# Patient Record
Sex: FEMALE | Race: BLACK OR AFRICAN AMERICAN | URBAN - METROPOLITAN AREA
[De-identification: names, ages, dates, MRNs, and addresses within clinical notes are randomized per-mention and may not be internally consistent; named-entity substitution may affect disease eponyms.]

---

## 2018-08-28 ENCOUNTER — TELEPHONE (OUTPATIENT)
Dept: VASCULAR SURGERY | Facility: CLINIC | Age: 58
End: 2018-08-28

## 2018-08-28 DIAGNOSIS — R60.0 EDEMA OF LOWER EXTREMITY: Primary | ICD-10-CM

## 2018-08-28 NOTE — TELEPHONE ENCOUNTER
I called with the aid of a [facility]  and spoke with patient daughter.  I wasn't able to speak with Manasa per daughter who answered all my questions.  Manasa has had RLE swelling from her whole leg hip to toe for the last few years.  Pt has not had vein surgeries, no trauma to leg or pelvis, no DVT.  Pt has tried compression stockings and a machine that squeezed her legs and that didn't help per daughter.  No slow healing wounds.  Pt has pain on and off with her legs.  Pt scheduled for RLE venous comp study prior to seeing Dr. Alfaro.  All questions answered.  LAYNE Castelan, RN, BSN  Interventional Radiology Care Coordinator   Phone:  [phone]

## 2018-08-30 ENCOUNTER — RADIANT APPOINTMENT (OUTPATIENT)
Dept: ULTRASOUND IMAGING | Facility: CLINIC | Age: 58
End: 2018-08-30
Attending: RADIOLOGY
Payer: COMMERCIAL

## 2018-08-30 ENCOUNTER — OFFICE VISIT (OUTPATIENT)
Dept: VASCULAR SURGERY | Facility: CLINIC | Age: 58
End: 2018-08-30
Payer: COMMERCIAL

## 2018-08-30 VITALS — SYSTOLIC BLOOD PRESSURE: 196 MMHG | HEART RATE: 53 BPM | DIASTOLIC BLOOD PRESSURE: 83 MMHG

## 2018-08-30 DIAGNOSIS — R60.0 LEG EDEMA, RIGHT: ICD-10-CM

## 2018-08-30 DIAGNOSIS — R60.0 LEG EDEMA, RIGHT: Primary | ICD-10-CM

## 2018-08-30 DIAGNOSIS — R60.0 EDEMA OF LOWER EXTREMITY: ICD-10-CM

## 2018-08-30 LAB
BASOPHILS # BLD AUTO: 0 10E9/L (ref 0–0.2)
BASOPHILS NFR BLD AUTO: 0.6 %
DIFFERENTIAL METHOD BLD: ABNORMAL
EOSINOPHIL # BLD AUTO: 0.1 10E9/L (ref 0–0.7)
EOSINOPHIL NFR BLD AUTO: 2.5 %
ERYTHROCYTE [DISTWIDTH] IN BLOOD BY AUTOMATED COUNT: 13.8 % (ref 10–15)
HCT VFR BLD AUTO: 42 % (ref 35–47)
HGB BLD-MCNC: 13.1 G/DL (ref 11.7–15.7)
IMM GRANULOCYTES # BLD: 0 10E9/L (ref 0–0.4)
IMM GRANULOCYTES NFR BLD: 0 %
LYMPHOCYTES # BLD AUTO: 1.7 10E9/L (ref 0.8–5.3)
LYMPHOCYTES NFR BLD AUTO: 52.8 %
MCH RBC QN AUTO: 29.8 PG (ref 26.5–33)
MCHC RBC AUTO-ENTMCNC: 31.2 G/DL (ref 31.5–36.5)
MCV RBC AUTO: 96 FL (ref 78–100)
MONOCYTES # BLD AUTO: 0.3 10E9/L (ref 0–1.3)
MONOCYTES NFR BLD AUTO: 7.8 %
NEUTROPHILS # BLD AUTO: 1.2 10E9/L (ref 1.6–8.3)
NEUTROPHILS NFR BLD AUTO: 36.3 %
NRBC # BLD AUTO: 0 10*3/UL
NRBC BLD AUTO-RTO: 0 /100
PLATELET # BLD AUTO: 215 10E9/L (ref 150–450)
RBC # BLD AUTO: 4.39 10E12/L (ref 3.8–5.2)
RETICS # AUTO: 73.3 10E9/L (ref 25–95)
RETICS/RBC NFR AUTO: 1.7 % (ref 0.5–2)
WBC # BLD AUTO: 3.2 10E9/L (ref 4–11)

## 2018-08-30 RX ORDER — TIMOLOL MALEATE 2.5 MG/ML
1 SOLUTION/ DROPS OPHTHALMIC 2 TIMES DAILY
COMMUNITY
End: 2020-02-04

## 2018-08-30 RX ORDER — BRIMONIDINE TARTRATE 2 MG/ML
1 SOLUTION/ DROPS OPHTHALMIC 3 TIMES DAILY
COMMUNITY
End: 2020-02-04

## 2018-08-30 ASSESSMENT — PAIN SCALES - GENERAL: PAINLEVEL: EXTREME PAIN (8)

## 2018-08-30 NOTE — PROGRESS NOTES
INTERVENTIONAL RADIOLOGY CONSULTATION    Name: Manasa Rosales  Age: 58 year old   Referring Physician: Dr. Colmenares   REASON FOR REFERRAL: Right lower extremity edema.    HPI: Manasa Rosales is a 58-year-old female referred to our clinic for right lower extremity edema.  She has progressive right lower extremity edema for the last 2-1/2 years.  Before then, she states her leg was normal.  She also complains of burning sensation and pain in the back of her leg.  She tried pneumatic compression pumps which did not help. [place] came [place] [facility] in [place].  Her leg swelling started when she was in [facility] approximately 1 year prior to her move to the .      She has a history of snake bite when she was young.  She was hospitalized a couple months ago with clinical suspicion of cellulitis.     Venous competency ultrasound performed today did not demonstrate  venous incompetency.    PAST MEDICAL HISTORY:   History reviewed. No pertinent past medical history.    PAST SURGICAL HISTORY:   History reviewed. No pertinent surgical history.    FAMILY HISTORY:   History reviewed. No pertinent family history.    SOCIAL HISTORY:   Social History   Substance Use Topics     Smoking status: Never Smoker     Smokeless tobacco: Never Used     Alcohol use Not on file       PROBLEM LIST:   There are no active problems to display for this patient.      MEDICATIONS:   Prescription Medications as of 8/30/2018             brimonidine (ALPHAGAN) 0.2 % ophthalmic solution Place 1 drop into both eyes 3 times daily    timolol (TIMOPTIC) 0.25 % ophthalmic solution 1 drop 2 times daily          ALLERGIES:   Review of patient's allergies indicates no known allergies.    ROS:  As stated in the HPI.    Physical Examination:   VITALS:   /83 (BP Location: Right leg, Patient Position: Chair, Cuff Size: Adult Regular)  Pulse 53  Constitutional: healthy, alert and no distress  Extremities: Diffusely swollen right lower extremity. No bulging  varicosities.  No wounds.      DP  PT    Left  2/2  Dopplerable   Right  Dopplerable -       Labs:    BMP RESULTS:  No results found for: NA, POTASSIUM, CHLORIDE, CO2, ANIONGAP, GLC, BUN, CR, GFRESTIMATED, GFRESTBLACK, IRINEO     CBC RESULTS:  Lab Results   Component Value Date    WBC 3.2 (L) 08/30/2018    RBC 4.39 08/30/2018    HGB 13.1 08/30/2018    HCT 42.0 08/30/2018    MCV 96 08/30/2018    MCH 29.8 08/30/2018    MCHC 31.2 (L) 08/30/2018    RDW 13.8 08/30/2018     08/30/2018       INR/PTT:  No results found for: INR, PTT    Diagnostic studies: Right lower extremity venous competency duplex today is negative for venous incompetency.  No DVT. Normal R CFV waveforms suggests no venous outflow obstruction.     Assessment and plan: 58-year-old female with progressively increasing right lower extremity swelling over the last 2-1/2 years.  She has a negative venous workup with no varicosities, venous obstruciton or venous incompetency.  Her right lower extremity edema is therefore lymphatic in nature / lymphedema.  Lymphoscintigraphy would help confirm this diagnosis.  Must consider primary and secondary causes of lymphedema including Filariasis given initiation of signs/symptoms in her native country of [place].  We will order lymphoscintigraphy and refer her to a lymphedema clinic to help mobilize her edema. Patient lives in [place]. We will talk to her primary care physician to look for lymphedema clinic close to her house.  If there is not a lymphedema clinic nearby her house, she is willing to come here. Meanwhile we ordered blood tests to evaluate for Filariasis.    Ira Grewal  Vascular and Interventional Radiology Fellow    I was present with the fellow during the history and exam.  I discussed the case with the fellow and agree with the findings as documented in the assessment and plan.    I spent a total of 30 minutes face-to-face with Manasa Rosales during today's office visit.  Over 50% of this  time was spent counseling the patient and/or coordinating care regarding RLE lymphedema. See note for details.    Peter Alfaro MD  Interventional Radiology and Vascular Imaging Attending  Department of Radiology  [facility]  Patient Care Team:  Adarsh Quintana MD as PCP - General (Family Practice)  ADARSH QUINTANA        Photographs taken today at Dr. Alfaro's consult for RLE Edema:            LAYNE Castelan RN, BSN  Interventional Radiology Care Coordinator   Phone:  [phone]

## 2018-08-30 NOTE — LETTER
8/30/2018     RE: Manasa Rosales  [address] [place] [address]     Dear Colleague,    Thank you for referring your patient, Manasa Rosales, to the [facility] VASCULAR [facility] at [facility]. Please see a copy of my visit note below.        INTERVENTIONAL RADIOLOGY CONSULTATION    Name: Manasa Rosales  Age: 58 year old   Referring Physician: Dr. Colmenares   REASON FOR REFERRAL: Right lower extremity edema.    HPI: Manasa Rosales is a 58-year-old female referred to our clinic for right lower extremity edema.  She has progressive right lower extremity edema for the last 2-1/2 years.  Before then, she states her leg was normal.  She also complains of burning sensation and pain in the back of her leg.  She tried pneumatic compression pumps which did not help. [place] came [place] [facility] in [place].  Her leg swelling started when she was in [facility] approximately 1 year prior to her move to the .      She has a history of snake bite when she was young.  She was hospitalized a couple months ago with clinical suspicion of cellulitis.     Venous competency ultrasound performed today did not demonstrate  venous incompetency.    PAST MEDICAL HISTORY:   History reviewed. No pertinent past medical history.    PAST SURGICAL HISTORY:   History reviewed. No pertinent surgical history.    FAMILY HISTORY:   History reviewed. No pertinent family history.    SOCIAL HISTORY:   Social History   Substance Use Topics     Smoking status: Never Smoker     Smokeless tobacco: Never Used     Alcohol use Not on file       PROBLEM LIST:   There are no active problems to display for this patient.      MEDICATIONS:   Prescription Medications as of 8/30/2018             brimonidine (ALPHAGAN) 0.2 % ophthalmic solution Place 1 drop into both eyes 3 times daily    timolol (TIMOPTIC) 0.25 % ophthalmic solution 1 drop 2 times daily          ALLERGIES:   Review of patient's allergies indicates no known  allergies.    ROS:  As stated in the HPI.    Physical Examination:   VITALS:   /83 (BP Location: Right leg, Patient Position: Chair, Cuff Size: Adult Regular)  Pulse 53  Constitutional: healthy, alert and no distress  Extremities: Diffusely swollen right lower extremity. No bulging varicosities.  No wounds.      DP  PT    Left  2/2  Dopplerable   Right  Dopplerable -       Labs:    BMP RESULTS:  No results found for: NA, POTASSIUM, CHLORIDE, CO2, ANIONGAP, GLC, BUN, CR, GFRESTIMATED, GFRESTBLACK, IRINEO     CBC RESULTS:  Lab Results   Component Value Date    WBC 3.2 (L) 08/30/2018    RBC 4.39 08/30/2018    HGB 13.1 08/30/2018    HCT 42.0 08/30/2018    MCV 96 08/30/2018    MCH 29.8 08/30/2018    MCHC 31.2 (L) 08/30/2018    RDW 13.8 08/30/2018     08/30/2018       INR/PTT:  No results found for: INR, PTT    Diagnostic studies: Right lower extremity venous competency duplex today is negative for venous incompetency.  No DVT. Normal R CFV waveforms suggests no venous outflow obstruction.     Assessment and plan: 58-year-old female with progressively increasing right lower extremity swelling over the last 2-1/2 years.  She has a negative venous workup with no varicosities, venous obstruciton or venous incompetency.  Her right lower extremity edema is therefore lymphatic in nature / lymphedema.  Lymphoscintigraphy would help confirm this diagnosis.  Must consider primary and secondary causes of lymphedema including Filariasis given initiation of signs/symptoms in her native country of [place].  We will order lymphoscintigraphy and refer her to a lymphedema clinic to help mobilize her edema. Patient lives in [place]. We will talk to her primary care physician to look for lymphedema clinic close to her house.  If there is not a lymphedema clinic nearby her house, she is willing to come here. Meanwhile we ordered blood tests to evaluate for Filariasis.    [facility]ark[facility]  Vascular and Interventional Radiology  Fellow    I was present with the fellow during the history and exam.  I discussed the case with the fellow and agree with the findings as documented in the assessment and plan.    I spent a total of 30 minutes face-to-face with Manasa JOSE Rosales during today's office visit.  Over 50% of this time was spent counseling the patient and/or coordinating care regarding RLE lymphedema. See note for details.    Peter Alfaro MD  Interventional Radiology and Vascular Imaging Attending  Department of Radiology  [facility]      CC  Patient Care Team:  Adarsh Quintana MD as PCP - General (Family Practice)  ADARSH QUINTANA        Photographs taken today at Dr. Alfaro's consult for RLE Edema:            LAYNE Castelan RN, BSN  Interventional Radiology Care Coordinator   Phone:  [phone]    Again, thank you for allowing me to participate in the care of your patient.      Sincerely,    Peter Alfaro MD

## 2018-08-30 NOTE — NURSING NOTE
Chief Complaint   Patient presents with     Consult     Consult for lymphedema     /83 (BP Location: Right leg, Patient Position: Chair, Cuff Size: Adult Regular)  Pulse 53     ZULMA Green

## 2018-08-30 NOTE — MR AVS SNAPSHOT
After Visit Summary   2018    Manasa Rosales    MRN: [id number]           Patient Information     Date Of Birth          1960        Visit Information        Provider Department      2018 10:25 AM Wiliam Brown Michael S, MD [facility]        Care Instructions    You have been seen today for lymphedema of your Right leg with Dr. Alfaro    -Dr. Alfaro to connect with Dr. Colmenares PCP with the following recommendations    1.  Lymphoscintigraphy test to be done at [facility] at your convenience, please call [phone] to schedule  2.  Lymphedema clinic assessment  3.  Blood test for infection.    Please don't hesitate to contact me with questions or concerns,     AJay Castelan RN, BSN  Interventional Radiology Care Coordinator   Phone:  [phone]            Follow-ups after your visit        Who to contact     Please call your clinic at [phone] to:    Ask questions about your health    Make or cancel appointments    Discuss your medicines    Learn about your test results    Speak to your doctor            Additional Information About Your Visit        Aq[employer] Information     [employer] is an electronic gateway that provides easy, online access to your medical records. With [employer], you can request a clinic appointment, read your test results, renew a prescription or communicate with your care team.     To sign up for [employer] visit the website at [email or web address]   You will be asked to enter the access code listed below, as well as some personal information. Please follow the directions to create your username and password.     Your access code is: [email or web address]  Expires: 2018  6:31 AM     Your access code will  in 90 days. If you need help or a new code, please contact your [facility] or call [phone] for assistance.        Care EveryWhere ID     This is your Care EveryWhere ID. This could be used by  other organizations to access your [place] medical records  [id number]        Your Vitals Were     Pulse                   53            Blood Pressure from Last 3 Encounters:   08/30/18 196/83    Weight from Last 3 Encounters:   No data found for Wt              Today, you had the following     No orders found for display       Primary Care Provider Office Phone # Fax #    Adarsh Colmenares [phone]       [facility] [address] [address]        Equal Access to Services     KETAN LORENZ : Hadii aad ku hadasho Soomaali, waaxda luqadaha, qaybta kaalmada adeegyada, waxay idiin hayaan adeeg kharash la'jeannan . So [facility] [phone].    ATENCIÓN: Si habla español, tiene a colin disposición servicios gratuitos de asistencia lingüística. [facility] [phone].    We comply with applicable federal civil rights laws and [place] laws. We do not discriminate on the basis of race, color, national origin, age, disability, sex, sexual orientation, or gender identity.            Thank you!     Thank you for choosing [facility]  for your care. Our goal is always to provide you with excellent care. Hearing back from our patients is one way we can continue to improve our services. Please take a few minutes to complete the written survey that you may receive in the mail after your visit with us. Thank you!             Your Updated Medication List - Protect others around you: Learn how to safely use, store and throw away your medicines at www.disposemymeds.org.          This list is accurate as of 8/30/18 11:23 AM.  Always use your most recent med list.                   Brand Name Dispense Instructions for use Diagnosis    brimonidine 0.2 % ophthalmic solution    ALPHAGAN     Place 1 drop into both eyes 3 times daily        timolol 0.25 % ophthalmic solution    TIMOPTIC     1 drop 2 times daily

## 2018-08-30 NOTE — PATIENT INSTRUCTIONS
You have been seen today for lymphedema of your Right leg with Dr. Alfaro    -Dr. Alfaro to connect with Dr. Colmenares PCP with the following recommendations    1.  Lymphoscintigraphy test to be done at [facility] at your convenience, please call [phone] to schedule  2.  Lymphedema clinic assessment  3.  Blood test for infection.    Please don't hesitate to contact me with questions or concerns,     LAYNE Castelan RN, BSN  Interventional Radiology Care Coordinator   Phone:  [phone]

## 2018-08-31 ENCOUNTER — TELEPHONE (OUTPATIENT)
Dept: INTERVENTIONAL RADIOLOGY/VASCULAR | Facility: CLINIC | Age: 58
End: 2018-08-31

## 2018-08-31 LAB
COPATH REPORT: NORMAL
MISCELLANEOUS TEST: NORMAL
PARASITE SPEC INSPECT: NORMAL
SPECIMEN SOURCE: NORMAL

## 2018-08-31 NOTE — TELEPHONE ENCOUNTER
[facility] Call Center    Phone Message    May a detailed message be left on voicemail: yes    Reason for Call: Other: Adarsh called on pt's behalf to discuss what her next step will be with Dr. Alfaro.  Please call back to advise. Thanks.     Action Taken: Message routed to:  [facility] EVAN

## 2018-09-01 LAB
LOCATION PERFORMED: NORMAL
RESULT: NORMAL
SEND OUTS MISC TEST CODE: NORMAL
SEND OUTS MISC TEST SPECIMEN: NORMAL
TEST NAME: NORMAL

## 2018-09-04 ENCOUNTER — TELEPHONE (OUTPATIENT)
Dept: VASCULAR SURGERY | Facility: CLINIC | Age: 58
End: 2018-09-04

## 2018-09-04 DIAGNOSIS — R60.0 EDEMA LEG: Primary | ICD-10-CM

## 2018-09-04 NOTE — TELEPHONE ENCOUNTER
I called with the aid of a [facility] .  Left a voicemail requesting a call back.  Next steps are pt to call to set up lymphoscintigraphy and Dr. Alfaro to connect with referring provider Dr. Sierra.  LAYNE Castelan, RN, BSN  Interventional Radiology Care Coordinator   Phone:  [phone]

## 2018-09-07 ENCOUNTER — TELEPHONE (OUTPATIENT)
Dept: VASCULAR SURGERY | Facility: CLINIC | Age: 58
End: 2018-09-07

## 2018-09-07 DIAGNOSIS — R60.9 EDEMA: Primary | ICD-10-CM

## 2018-09-07 NOTE — TELEPHONE ENCOUNTER
[facility] Call Center    Phone Message    May a detailed message be left on voicemail: yes    Reason for Call: Other: Patient tried to and schedule NM LYMPHOSCINTIGRAPHY INJECTION AND SCAN. The  would like Caroline to call and schedule to give precise information.      Action Taken: Message routed to:  [facility] Vascular

## 2018-09-12 LAB
RESULT: NORMAL
SEND OUTS MISC TEST CODE: NORMAL
SEND OUTS MISC TEST SPECIMEN: NORMAL
TEST NAME: NORMAL

## 2018-09-20 ENCOUNTER — HOSPITAL ENCOUNTER (OUTPATIENT)
Dept: NUCLEAR MEDICINE | Facility: CLINIC | Age: 58
Setting detail: NUCLEAR MEDICINE
Discharge: HOME | End: 2018-09-20
Attending: RADIOLOGY | Admitting: RADIOLOGY
Payer: COMMERCIAL

## 2018-09-20 DIAGNOSIS — R60.0 EDEMA LEG: ICD-10-CM

## 2018-09-20 PROCEDURE — T1013 SIGN LANG/ORAL INTERPRETER: HCPCS | Mod: U3

## 2018-09-20 PROCEDURE — A9541 TC99M SULFUR COLLOID: HCPCS | Performed by: RADIOLOGY

## 2018-09-20 PROCEDURE — 34300033 ZZH RX 343: Performed by: RADIOLOGY

## 2018-09-20 PROCEDURE — 78195 LYMPH SYSTEM IMAGING: CPT

## 2018-09-20 RX ADMIN — TECHNETIUM TC 99M SULFUR COLLOID 8 MILLICURIE: KIT at 14:15

## 2018-09-21 NOTE — TELEPHONE ENCOUNTER
I called patients son with the aid of a [facility] .  Dr. Alfaro has reviewed the lymphoscintigraphy results and the results of the lab test.  Lab for Filaria was negative.  Dr. Alfaro would like pt to get CT scan abd/pelvis to evaluate for RLE lymph obstruction.  We left a message for the son to get her scheduled.  LAYNE Castelan RN, BSN  Interventional Radiology Care Coordinator   Phone:  [phone]

## 2018-10-04 ENCOUNTER — RADIANT APPOINTMENT (OUTPATIENT)
Dept: CT IMAGING | Facility: CLINIC | Age: 58
End: 2018-10-04
Attending: RADIOLOGY
Payer: COMMERCIAL

## 2018-10-04 DIAGNOSIS — R60.9 EDEMA: ICD-10-CM

## 2018-10-04 RX ORDER — IOPAMIDOL 755 MG/ML
88 INJECTION, SOLUTION INTRAVASCULAR ONCE
Status: COMPLETED | OUTPATIENT
Start: 2018-10-04 | End: 2018-10-04

## 2018-10-04 RX ADMIN — IOPAMIDOL 88 ML: 755 INJECTION, SOLUTION INTRAVASCULAR at 15:57

## 2018-10-04 NOTE — DISCHARGE INSTRUCTIONS

## 2018-11-12 ENCOUNTER — TELEPHONE (OUTPATIENT)
Dept: VASCULAR SURGERY | Facility: CLINIC | Age: 58
End: 2018-11-12

## 2018-11-12 NOTE — TELEPHONE ENCOUNTER
I called and left a voicemail.  After discussion with Dr. Alfaro after reviewing latest imaging.  Patient has lymphedema.  Dr. Alfaro can't explain why from his work up of her legs and abd, No radiographic findings to explain right lower extremity edema. I have left my call back number for questions.  LAYNE Castelan RN, BSN  Interventional Radiology Care Coordinator   Phone:  [phone]

## 2019-12-31 ENCOUNTER — TRANSFERRED RECORDS (OUTPATIENT)
Dept: HEALTH INFORMATION MANAGEMENT | Facility: CLINIC | Age: 59
End: 2019-12-31

## 2020-01-13 ENCOUNTER — TRANSFERRED RECORDS (OUTPATIENT)
Dept: HEALTH INFORMATION MANAGEMENT | Facility: CLINIC | Age: 60
End: 2020-01-13

## 2020-02-04 ENCOUNTER — OFFICE VISIT (OUTPATIENT)
Dept: OPHTHALMOLOGY | Facility: CLINIC | Age: 60
End: 2020-02-04
Attending: OPHTHALMOLOGY
Payer: COMMERCIAL

## 2020-02-04 DIAGNOSIS — H40.1494 PSEUDOEXFOLIATION GLAUCOMA, INDETERMINATE STAGE: ICD-10-CM

## 2020-02-04 DIAGNOSIS — R60.0 EXTREMITY EDEMA: ICD-10-CM

## 2020-02-04 DIAGNOSIS — A18.89 EXTRAPULMONARY TUBERCULOSIS: ICD-10-CM

## 2020-02-04 DIAGNOSIS — H35.81 MACULAR EDEMA: ICD-10-CM

## 2020-02-04 DIAGNOSIS — D70.9 NEUTROPENIA, UNSPECIFIED TYPE (H): ICD-10-CM

## 2020-02-04 DIAGNOSIS — H25.812 COMBINED FORMS OF AGE-RELATED CATARACT OF LEFT EYE: ICD-10-CM

## 2020-02-04 DIAGNOSIS — H40.003 GLAUCOMA SUSPECT OF BOTH EYES: Primary | ICD-10-CM

## 2020-02-04 PROCEDURE — 92083 EXTENDED VISUAL FIELD XM: CPT | Mod: ZF | Performed by: OPHTHALMOLOGY

## 2020-02-04 PROCEDURE — 76519 ECHO EXAM OF EYE: CPT | Mod: LT,ZF | Performed by: OPHTHALMOLOGY

## 2020-02-04 PROCEDURE — 92133 CPTRZD OPH DX IMG PST SGM ON: CPT | Mod: ZF | Performed by: OPHTHALMOLOGY

## 2020-02-04 PROCEDURE — G0463 HOSPITAL OUTPT CLINIC VISIT: HCPCS | Mod: ZF

## 2020-02-04 RX ORDER — DORZOLAMIDE HYDROCHLORIDE AND TIMOLOL MALEATE 20; 5 MG/ML; MG/ML
1 SOLUTION/ DROPS OPHTHALMIC 2 TIMES DAILY
Qty: 1 BOTTLE | Refills: 11 | Status: SHIPPED | OUTPATIENT
Start: 2020-02-04

## 2020-02-04 RX ORDER — LATANOPROST 50 UG/ML
1 SOLUTION/ DROPS OPHTHALMIC AT BEDTIME
Qty: 1 BOTTLE | Refills: 11 | Status: SHIPPED | OUTPATIENT
Start: 2020-02-04 | End: 2021-02-11

## 2020-02-04 ASSESSMENT — VISUAL ACUITY
OD_SC+: -1
OS_SC: J10-
OD_SC: J10-
METHOD: TUMBLING E 'S
OS_PH_SC+: +1
OS_PH_SC: 20/150
OS_SC+: -1
OS_SC: 20/150
OD_SC: 20/60

## 2020-02-04 ASSESSMENT — CONF VISUAL FIELD
OD_NORMAL: 1
OS_NORMAL: 1
METHOD: COUNTING FINGERS

## 2020-02-04 ASSESSMENT — EXTERNAL EXAM - RIGHT EYE: OD_EXAM: NORMAL

## 2020-02-04 ASSESSMENT — SLIT LAMP EXAM - LIDS
COMMENTS: NORMAL
COMMENTS: NORMAL

## 2020-02-04 ASSESSMENT — TONOMETRY
OD_IOP_MMHG: 8
IOP_METHOD: APPLANATION
OS_IOP_MMHG: 8

## 2020-02-04 ASSESSMENT — EXTERNAL EXAM - LEFT EYE: OS_EXAM: NORMAL

## 2020-02-04 ASSESSMENT — REFRACTION_MANIFEST
OS_CYLINDER: SPHERE
OS_ADD: +2.50
OD_AXIS: 022
OD_CYLINDER: +2.25
OD_SPHERE: -2.00
OS_SPHERE: -2.25
OD_ADD: +2.50

## 2020-02-04 ASSESSMENT — CUP TO DISC RATIO
OS_RATIO: 0.75
OD_RATIO: 0.4

## 2020-02-04 NOTE — LETTER
"2/4/2020     RE: Manasa Rosales  [address] [place] [address]     Dear Dr. Pate,    Thank you for referring your patient, Manasa Rosales, to the EYE CLINIC at [facility]. Please see a copy of my visit note below.    Chief Complaint(s) and History of Present Illness(es)     Consult For     Associated symptoms: foreign body sensation, burning, itching, dryness,   tearing (LE.) and floaters.  Negative for eye pain and flashes    Comments: Cataract and glaucoma.        Comments     Pt feels vision is stable since last visit to another clinic a month ago.    Pt gets FBS from time to time.  Pt BE get dry and she rubs them and makes   them itch.  LE lua.  Pt also reports seeing floaters \"lately\"..    [facility] Nails February 4, 2020 2:26 PM         Pt was referred for cataract and glaucoma surgery. Pt was diagnosed with pseudoexfoliation glaucoma. No Hx of DM and never told she has retinopathy. Pt has lymphedema and elevated liver enzymes, extrapulmonary active TB. Son reports edema on her face intermittently. She has not had echocardiogram per son, but had an EKG which they were told was normal. Cataract surgery in right eye was in 2018.      Review of systems for the eyes was negative other than the pertinent positives/negatives listed in the HPI.      Assessment & Plan      Manasa Rosales is a 60 year old female with the following diagnoses:   1. Glaucoma suspect of both eyes    2. Pseudoexfoliation glaucoma, indeterminate stage    3. Combined forms of age-related cataract of left eye    4. Macular edema - Both Eyes    5. Extrapulmonary tuberculosis    6. Neutropenia, unspecified type (H)    7. Extremity edema      Referral from Dr. Pate for advanced pseudoexfoliation glaucoma and cataract in the left eye.  Recently re-established care after being off drops for about 18 months.  Resumed latanoprost both eyes and Cosopt left eye after her last visit " with Dr. Pate.  Intraocular pressure greatly improved in both eyes and acceptable today in both eyes.  Exam today was very difficult due to patient fatigue and ability to cooperate.  Unable to get good baseline visual field.  OCT demonstrates marked nerve atrophy in the left eye > right eye.  Retinal nerve fiber layer thinning is out of proportion to the cupping seen.  Incidentally macular OCT reveals bilateral sub-foveal fluid with marked exudates.  Unfortunately the patient is unable to get Fluorescein angiography today.      We discussed a broad differential for the macular changes and importance of diagnosing and treating this prior to any ocular surgery.  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  She suffers from chronic lower extremity lymphedema.  Her son reports that her face also seems swollen on occasion.    I would recommend consultation with retina clinic.  They prefer to do this closer to home.  I will refer to [facility] in [place] for further work-up and treatment.  Can call to schedule cataract/glaucoma surgery with me once cleared by the retina team.    Continue Latanoprost in both eyes and cosopt twice a day in left eye for now.    Patient disposition:   Return for schedule surgery when cleared by retina.    Katlin Salamanca MD  Ophthalmology Resident, PGY-2       Attending Physician Attestation:  Complete documentation of historical and exam elements from today's encounter can be found in the full encounter summary report (not reduplicated in this progress note).  I personally obtained the chief complaint(s) and history of present illness.  I confirmed and edited as necessary the review of systems, past medical/surgical history, family history, social history, and examination findings as documented by others; and I  examined the patient myself.  I personally reviewed the relevant tests, images, and reports as documented above.  I formulated and edited as necessary the assessment and plan and discussed the findings and management plan with the patient and family. Attending Physician Image/Tesing Attestation: I personally reviewed the ophthalmic test(s) associated with this encounter, agree with the interpretation(s) as documented by the resident/fellow, and have edited the corresponding report(s) as necessary.  . - Colby Carrasquillo MD       Again, thank you for allowing me to participate in the care of your patient.      Sincerely,    Colby Carrasquillo MD

## 2020-02-04 NOTE — PROGRESS NOTES
"Chief Complaint(s) and History of Present Illness(es)     Consult For     Associated symptoms: foreign body sensation, burning, itching, dryness,   tearing (LE.) and floaters.  Negative for eye pain and flashes    Comments: Cataract and glaucoma.        Comments     Pt feels vision is stable since last visit to another clinic a month ago.    Pt gets FBS from time to time.  Pt BE get dry and she rubs them and makes   them itch.  LE lua.  Pt also reports seeing floaters \"lately\"..    Davonjaronjess Kavin, ASHU February 4, 2020 2:26 PM         Pt was referred for cataract and glaucoma surgery. Pt was diagnosed with pseudoexfoliation glaucoma. No Hx of DM and never told she has retinopathy. Pt has lymphedema and elevated liver enzymes, extrapulmonary active TB. Son reports edema on her face intermittently. She has not had echocardiogram per son, but had an EKG which they were told was normal. Cataract surgery in right eye was in 2018.      Review of systems for the eyes was negative other than the pertinent positives/negatives listed in the HPI.      Assessment & Plan      Manasa Rosales is a 60 year old female with the following diagnoses:   1. Glaucoma suspect of both eyes    2. Pseudoexfoliation glaucoma, indeterminate stage    3. Combined forms of age-related cataract of left eye    4. Macular edema - Both Eyes    5. Extrapulmonary tuberculosis    6. Neutropenia, unspecified type (H)    7. Extremity edema      Referral from Dr. Pate for advanced pseudoexfoliation glaucoma and cataract in the left eye.  Recently re-established care after being off drops for about 18 months.  Resumed latanoprost both eyes and Cosopt left eye after her last visit with Dr. Pate.  Intraocular pressure greatly improved in both eyes and acceptable today in both eyes.  Exam today was very difficult due to patient fatigue and ability to cooperate.  Unable to get good baseline visual field.  OCT demonstrates marked nerve atrophy in the left " eye > right eye.  Retinal nerve fiber layer thinning is out of proportion to the cupping seen.  Incidentally macular OCT reveals bilateral sub-foveal fluid with marked exudates.  Unfortunately the patient is unable to get Fluorescein angiography today.      We discussed a broad differential for the macular changes and importance of diagnosing and treating this prior to any ocular surgery.  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  She suffers from chronic lower extremity lymphedema.  Her son reports that her face also seems swollen on occasion.    I would recommend consultation with retina clinic.  They prefer to do this closer to home.  I will refer to [facility] in [place] for further work-up and treatment.  Can call to schedule cataract/glaucoma surgery with me once cleared by the retina team.    Continue Latanoprost in both eyes and cosopt twice a day in left eye for now.    Patient disposition:   Return for schedule surgery when cleared by retina.    Katlin Salamanca MD  Ophthalmology Resident, PGY-2       Attending Physician Attestation:  Complete documentation of historical and exam elements from today's encounter can be found in the full encounter summary report (not reduplicated in this progress note).  I personally obtained the chief complaint(s) and history of present illness.  I confirmed and edited as necessary the review of systems, past medical/surgical history, family history, social history, and examination findings as documented by others; and I examined the patient myself.  I personally reviewed the relevant tests, images, and reports as documented above.  I formulated and edited as necessary the assessment and plan and discussed the findings and management plan with the patient and family. Attending Physician  Image/Tesing Attestation: I personally reviewed the ophthalmic test(s) associated with this encounter, agree with the interpretation(s) as documented by the resident/fellow, and have edited the corresponding report(s) as necessary.  . - Colby Carrasquillo MD

## 2020-02-04 NOTE — NURSING NOTE
"Chief Complaints and History of Present Illnesses   Patient presents with     Consult For     Cataract and glaucoma.       Chief Complaint(s) and History of Present Illness(es)     Consult For     Associated symptoms: foreign body sensation, burning, itching, dryness, tearing (LE.) and floaters.  Negative for eye pain and flashes    Comments: Cataract and glaucoma.                Comments     Pt feels vision is stable since last visit to another clinic a month ago.  Pt gets FBS from time to time.  Pt BE get dry and she rubs them and makes them itch.  LE lua.  Pt also reports seeing floaters \"lately\"..    ASHU Nails February 4, 2020 2:26 PM                  "

## 2020-02-06 ENCOUNTER — TRANSFERRED RECORDS (OUTPATIENT)
Dept: HEALTH INFORMATION MANAGEMENT | Facility: CLINIC | Age: 60
End: 2020-02-06

## 2021-01-07 ENCOUNTER — TELEPHONE (OUTPATIENT)
Dept: OPHTHALMOLOGY | Facility: CLINIC | Age: 61
End: 2021-01-07

## 2021-01-07 ENCOUNTER — TRANSFERRED RECORDS (OUTPATIENT)
Dept: HEALTH INFORMATION MANAGEMENT | Facility: CLINIC | Age: 61
End: 2021-01-07

## 2021-02-11 ENCOUNTER — OFFICE VISIT (OUTPATIENT)
Dept: OPHTHALMOLOGY | Facility: CLINIC | Age: 61
End: 2021-02-11
Attending: OPHTHALMOLOGY
Payer: COMMERCIAL

## 2021-02-11 DIAGNOSIS — H35.81 MACULAR EDEMA: ICD-10-CM

## 2021-02-11 DIAGNOSIS — H30.93 CHORIORETINITIS OF BOTH EYES: ICD-10-CM

## 2021-02-11 DIAGNOSIS — H40.1494 PSEUDOEXFOLIATION GLAUCOMA, INDETERMINATE STAGE: ICD-10-CM

## 2021-02-11 DIAGNOSIS — Z96.1 PSEUDOPHAKIA OF RIGHT EYE: ICD-10-CM

## 2021-02-11 DIAGNOSIS — H40.1134 PRIMARY OPEN ANGLE GLAUCOMA (POAG) OF BOTH EYES, INDETERMINATE STAGE: ICD-10-CM

## 2021-02-11 DIAGNOSIS — H25.812 COMBINED FORMS OF AGE-RELATED CATARACT OF LEFT EYE: Primary | ICD-10-CM

## 2021-02-11 PROCEDURE — 99207 OCT OPTIC NERVE RNFL SPECTRALIS OU (BOTH EYES): CPT | Mod: 26 | Performed by: OPHTHALMOLOGY

## 2021-02-11 PROCEDURE — 92133 CPTRZD OPH DX IMG PST SGM ON: CPT | Performed by: OPHTHALMOLOGY

## 2021-02-11 PROCEDURE — 92134 CPTRZ OPH DX IMG PST SGM RTA: CPT | Performed by: OPHTHALMOLOGY

## 2021-02-11 PROCEDURE — 76519 ECHO EXAM OF EYE: CPT | Performed by: OPHTHALMOLOGY

## 2021-02-11 PROCEDURE — G0463 HOSPITAL OUTPT CLINIC VISIT: HCPCS

## 2021-02-11 PROCEDURE — 99214 OFFICE O/P EST MOD 30 MIN: CPT | Mod: GC | Performed by: OPHTHALMOLOGY

## 2021-02-11 PROCEDURE — 92015 DETERMINE REFRACTIVE STATE: CPT

## 2021-02-11 RX ORDER — LATANOPROST 50 UG/ML
1 SOLUTION/ DROPS OPHTHALMIC AT BEDTIME
Qty: 2.5 ML | Refills: 11 | Status: SHIPPED | OUTPATIENT
Start: 2021-02-11

## 2021-02-11 RX ORDER — KETOROLAC TROMETHAMINE 5 MG/ML
1 SOLUTION OPHTHALMIC 2 TIMES DAILY
Qty: 5 ML | Refills: 3 | Status: SHIPPED | OUTPATIENT
Start: 2021-02-11 | End: 2021-03-18

## 2021-02-11 ASSESSMENT — REFRACTION_MANIFEST
OD_ADD: +2.75
OD_AXIS: 180
OS_CYLINDER: +0.75
OS_ADD: +2.75
OS_SPHERE: -2.25
OD_SPHERE: -2.25
OD_CYLINDER: +1.25
OS_AXIS: 005

## 2021-02-11 ASSESSMENT — VISUAL ACUITY
OS_SC: 20/250
OD_SC: 20/100
METHOD: TUMBLING E 'S

## 2021-02-11 ASSESSMENT — EXTERNAL EXAM - RIGHT EYE: OD_EXAM: NORMAL

## 2021-02-11 ASSESSMENT — SLIT LAMP EXAM - LIDS
COMMENTS: NORMAL
COMMENTS: NORMAL

## 2021-02-11 ASSESSMENT — CONF VISUAL FIELD
OS_INFERIOR_TEMPORAL_RESTRICTION: 3
OS_INFERIOR_NASAL_RESTRICTION: 3
OD_SUPERIOR_NASAL_RESTRICTION: 3
OS_SUPERIOR_TEMPORAL_RESTRICTION: 3
OD_INFERIOR_NASAL_RESTRICTION: 3
OS_SUPERIOR_NASAL_RESTRICTION: 3

## 2021-02-11 ASSESSMENT — TONOMETRY
IOP_METHOD: TONOPEN
OD_IOP_MMHG: 15
OS_IOP_MMHG: 17

## 2021-02-11 ASSESSMENT — CUP TO DISC RATIO
OD_RATIO: 0.7
OS_RATIO: 0.75

## 2021-02-11 ASSESSMENT — EXTERNAL EXAM - LEFT EYE: OS_EXAM: NORMAL

## 2021-02-11 NOTE — NURSING NOTE
"Chief Complaints and History of Present Illnesses   Patient presents with     Glaucoma Suspect Follow Up     1 year follow up both eyes     Chief Complaint(s) and History of Present Illness(es)     Glaucoma Suspect Follow Up     Comments: 1 year follow up both eyes              Comments     Pt here with interp over the phone and her son today.  Pt states vision in LE is getting worse. Vision in RE has remained the same.  No eye pain today. Occasional itching in both eyes.  Pt also reports seeing \"lightning\" in her LE for the past year.  Pt currently taking Dorzolamide BID LE, but not Latanoprost    HARRY Ernst February 11, 2021 2:55 PM                      "

## 2021-02-11 NOTE — PROGRESS NOTES
"[facility]     Glaucoma Suspect Follow Up      Additional comments: 1 year follow up both eyes              Comments     Pt here with interp over the phone and her son today.  Pt states vision in LE is getting worse. Vision in RE has remained the same.  No eye pain today. Occasional itching in both eyes.  Pt also reports seeing \"lightning\" in her LE for the past year.  Pt currently taking Dorzolamide BID LE, but not Latanoprost    HARRY Ernst February 11, 2021 2:55 PM              Last edited by Lizandro Hodgson COMT on 2/11/2021  3:03 PM. (History)         Review of systems for the eyes was negative other than the pertinent positives/negatives listed in the HPI.      Assessment & Plan      Manasa Rosales is a 61 year old female with the following diagnoses:   1. Combined forms of age-related cataract of left eye    2. Pseudophakia of right eye    3. Primary open angle glaucoma (POAG) of both eyes, indeterminate stage    4. Chorioretinitis of both eyes    5. Macular edema - Both Eyes      Last visit here in 02/2020. Hx of TB chorioretinitis being followed by Dr. Umu Agarwal at [facility]. Last visit with Dr. Agarwal 01/2021. Sent back for cataract evaluation left eye and glaucoma evaluation. Cleared for surgery from retina standpoint. Worsening vision left eye, right eye stable. Currently using Cosopt BID left eye but not Latanoprost.    Discussed r/b/a of cataract surgery and unclear visual potential given chorioretinal pathology and glaucoma. Discussed risks of surgery with pseudoexfoliation. After full discussion, decision made to proceed with cataract surgery +/- Hydrus or ECP.  Reviewed guarded vision potential given concurrent glaucoma and retinal disease.    Start Ketorolac BID Left eye.  Resume latanoprost at bedtime both eyes     Special equipment/needs:  Anesthesia:MAC with block  Dilation:Good  Iris expansion:Not needed  Pseudoexfoliation: Pseudoexfoliation  Trypan Blue: Yes   Plan for CE/IOL with " either Hydrus vs ENDOCYLOPHOTOCOAGULATION (pending angle appearance after cataract extraction)  Inta-op intravitreal Triescence    OCT RNFL with diffuse thinning Both eyes  Intraocular pressure should be lower left eye   Restart Latanoprost QHS in both eyes      Nadeem Gould MD  Ophthalmology Resident, PGY-4       Attending Physician Attestation:  Complete documentation of historical and exam elements from today's encounter can be found in the full encounter summary report (not reduplicated in this progress note).  I personally obtained the chief complaint(s) and history of present illness.  I confirmed and edited as necessary the review of systems, past medical/surgical history, family history, social history, and examination findings as documented by others; and I examined the patient myself.  I personally reviewed the relevant tests, images, and reports as documented above.  I formulated and edited as necessary the assessment and plan and discussed the findings and management plan with the patient and family. Attending Physician Image/Tesing Attestation: I personally reviewed the ophthalmic test(s) associated with this encounter, agree with the interpretation(s) as documented by the resident/fellow, and have edited the corresponding report(s) as necessary.  . - Colby Carrasquillo MD

## 2021-02-11 NOTE — PATIENT INSTRUCTIONS
Continue Cosopt (blue top) twice a day     Start Latanoprost (teal top) at bedtime in both eyes    Start Ketorolac (grey top) twice a day in the left eye

## 2021-02-12 ENCOUNTER — PREP FOR PROCEDURE (OUTPATIENT)
Dept: OPHTHALMOLOGY | Facility: CLINIC | Age: 61
End: 2021-02-12

## 2021-02-12 PROBLEM — H30.93: Status: ACTIVE | Noted: 2021-02-12

## 2021-02-12 PROBLEM — H25.812 COMBINED FORMS OF AGE-RELATED CATARACT OF LEFT EYE: Status: ACTIVE | Noted: 2021-02-12

## 2021-02-12 PROBLEM — H40.1134 PRIMARY OPEN ANGLE GLAUCOMA (POAG) OF BOTH EYES, INDETERMINATE STAGE: Status: ACTIVE | Noted: 2021-02-12

## 2021-02-12 NOTE — TELEPHONE ENCOUNTER
Spoke with patient to schedule surgery with Dr. Carrasquillo    Surgery was scheduled on 3/1 at [facility]  Patient will have H&P at [facility]     Patient is aware a COVID-19 test is needed before their procedure. The test should be with-in 4 days of their procedure.   Test Details: Date 2/25  Location [facility]    Post-Op visit was scheduled on 3/18  Patient is aware a / is needed day of surgery.   Surgery packet was mailed 2/12, patient has my direct contact information for any further questions.

## 2021-02-16 NOTE — TELEPHONE ENCOUNTER
FUTURE VISIT INFORMATION      SURGERY INFORMATION:    Date: 3.1.21    Location[place] [facility] [place]     Surgeon:  Colby Carrasquillo     Anesthesia Type:  Combined MAC with Retrobulbar     Procedure: LEFT EYE PHACOEMULSIFICATION, CATARACT, WITH INTRAOCULAR LENS IMPLANT, POSSIBLE LEFT ENDOCYCLOPHOTOCOAGULATION    Consult: 21    RECORDS REQUESTED FROM:       Primary Care Provider: Adarsh Colmenares     Most recent EKG+ [facility]     Action 21 MJ   Action Taken Double checked pre-visit. No ekg strips. Sent request to .

## 2021-02-24 NOTE — TELEPHONE ENCOUNTER
Records received 02/24/21    Facility  [facility]   Outcome 12/31/2019 ECG tracings received and sent to urgent scanning - [facility]

## 2021-02-25 ENCOUNTER — APPOINTMENT (OUTPATIENT)
Dept: LAB | Facility: CLINIC | Age: 61
End: 2021-02-25
Payer: COMMERCIAL

## 2021-02-25 ENCOUNTER — ANESTHESIA EVENT (OUTPATIENT)
Dept: SURGERY | Facility: AMBULATORY SURGERY CENTER | Age: 61
End: 2021-02-25

## 2021-02-25 ENCOUNTER — VIRTUAL VISIT (OUTPATIENT)
Dept: SURGERY | Facility: CLINIC | Age: 61
End: 2021-02-25
Payer: COMMERCIAL

## 2021-02-25 ENCOUNTER — PRE VISIT (OUTPATIENT)
Dept: SURGERY | Facility: CLINIC | Age: 61
End: 2021-02-25

## 2021-02-25 VITALS
HEART RATE: 51 BPM | SYSTOLIC BLOOD PRESSURE: 132 MMHG | BODY MASS INDEX: 27.94 KG/M2 | OXYGEN SATURATION: 99 % | HEIGHT: 61 IN | RESPIRATION RATE: 16 BRPM | TEMPERATURE: 97.7 F | DIASTOLIC BLOOD PRESSURE: 81 MMHG | WEIGHT: 148 LBS

## 2021-02-25 DIAGNOSIS — Z01.818 PRE-OP EVALUATION: Primary | ICD-10-CM

## 2021-02-25 LAB
SARS-COV-2 RNA RESP QL NAA+PROBE: NORMAL
SPECIMEN SOURCE: NORMAL

## 2021-02-25 PROCEDURE — U0003 INFECTIOUS AGENT DETECTION BY NUCLEIC ACID (DNA OR RNA); SEVERE ACUTE RESPIRATORY SYNDROME CORONAVIRUS 2 (SARS-COV-2) (CORONAVIRUS DISEASE [COVID-19]), AMPLIFIED PROBE TECHNIQUE, MAKING USE OF HIGH THROUGHPUT TECHNOLOGIES AS DESCRIBED BY CMS-2020-01-R: HCPCS | Mod: 90 | Performed by: PATHOLOGY

## 2021-02-25 PROCEDURE — 99203 OFFICE O/P NEW LOW 30 MIN: CPT | Mod: GT | Performed by: PHYSICIAN ASSISTANT

## 2021-02-25 PROCEDURE — U0005 INFEC AGEN DETEC AMPLI PROBE: HCPCS | Mod: 90 | Performed by: PATHOLOGY

## 2021-02-25 RX ORDER — ACETAMINOPHEN 325 MG/1
325-650 TABLET ORAL EVERY 6 HOURS PRN
COMMUNITY

## 2021-02-25 SDOH — HEALTH STABILITY: MENTAL HEALTH: HOW OFTEN DO YOU HAVE 6 OR MORE DRINKS ON ONE OCCASION?: NEVER

## 2021-02-25 SDOH — HEALTH STABILITY: MENTAL HEALTH: HOW MANY STANDARD DRINKS CONTAINING ALCOHOL DO YOU HAVE ON A TYPICAL DAY?: NOT ASKED

## 2021-02-25 SDOH — HEALTH STABILITY: MENTAL HEALTH: HOW OFTEN DO YOU HAVE A DRINK CONTAINING ALCOHOL?: NEVER

## 2021-02-25 ASSESSMENT — LIFESTYLE VARIABLES: TOBACCO_USE: 0

## 2021-02-25 ASSESSMENT — PAIN SCALES - GENERAL
PAINLEVEL: MILD PAIN (3)
PAINLEVEL: MILD PAIN (3)

## 2021-02-25 ASSESSMENT — MIFFLIN-ST. JEOR
SCORE: 1168.7
SCORE: 1168.7

## 2021-02-25 NOTE — ANESTHESIA PREPROCEDURE EVALUATION
Anesthesia Pre-Procedure Evaluation    Patient: Manasa Rosales   MRN: [id number] : 1960        Preoperative Diagnosis: Combined forms of age-related cataract of left eye [H25.812]  Chorioretinitis of both eyes [H30.93]  Primary open angle glaucoma (POAG) of both eyes, indeterminate stage [H40.1134]   Procedure : Procedure(s):  LEFT EYE PHACOEMULSIFICATION, CATARACT, WITH INTRAOCULAR LENS IMPLANT, POSSIBLE LEFT ENDOCYCLOPHOTOCOAGULATION  POSSIBLE INSERTION of HYDRUS STENT  INJECTION INTRAVITREAL TRIESCENCE     Past Medical History:   Diagnosis Date     Cataract      Essential hypertension      Glaucoma (increased eye pressure)      Lymphedema      TB lung, latent       Past Surgical History:   Procedure Laterality Date     CATARACT IOL, RT/LT Right       No Known Allergies   Social History     Tobacco Use     Smoking status: Never Smoker     Smokeless tobacco: Never Used   Substance Use Topics     Alcohol use: Not on file      Wt Readings from Last 1 Encounters:   No data found for Wt        Anesthesia Evaluation   Pt has had prior anesthetic. Type: MAC.    No history of anesthetic complications       ROS/MED HX  ENT/Pulmonary:    (-) tobacco use   Neurologic: Comment: headaches      Cardiovascular:     (+) hypertension-----Previous cardiac testing   Echo: Date: Results:    Stress Test: Date: Results:    ECG Reviewed: Date: 2020 Results:  NSR  Cath: Date: Results:   (-) taking anticoagulants/antiplatelets   METS/Exercise Tolerance: 3 - Able to walk 1-2 blocks without stopping    Hematologic:  - neg hematologic  ROS  (-) history of blood transfusion   Musculoskeletal:  - neg musculoskeletal ROS     GI/Hepatic:  - neg GI/hepatic ROS     Renal/Genitourinary:  - neg Renal ROS     Endo:  - neg endo ROS     Psychiatric/Substance Use:  - neg psychiatric ROS     Infectious Disease: Comment: H/o latent TB s/p treatment      Malignancy:  - neg malignancy ROS     Other:            Physical Exam    Airway  airway exam  normal           Respiratory Devices and Support         Dental  no notable dental history         Cardiovascular   cardiovascular exam normal          Pulmonary   pulmonary exam normal                OUTSIDE LABS:  CBC:   Lab Results   Component Value Date    WBC 3.2 (L) 08/30/2018    HGB 13.1 08/30/2018    HCT 42.0 08/30/2018     08/30/2018     BMP: No results found for: NA, POTASSIUM, CHLORIDE, CO2, BUN, CR, GLC  COAGS: No results found for: PTT, INR, FIBR  POC: No results found for: BGM, HCG, HCGS  HEPATIC: No results found for: ALBUMIN, PROTTOTAL, ALT, AST, GGT, ALKPHOS, BILITOTAL, BILIDIRECT, CARISSA  OTHER: No results found for: PH, LACT, A1C, IRINEO, PHOS, MAG, LIPASE, AMYLASE, TSH, T4, T3, CRP, SED    Anesthesia Plan    ASA Status:  3   NPO Status:  NPO Appropriate    Anesthesia Type: MAC.     - Reason for MAC: straight local not clinically adequate   Induction: Intravenous.   Maintenance: TIVA.        Consents    Anesthesia Plan(s) and associated risks, benefits, and realistic alternatives discussed. Questions answered and patient/representative(s) expressed understanding.     - Discussed with:  Patient         Postoperative Care    Pain management: Oral pain medications.   PONV prophylaxis: Ondansetron (or other 5HT-3), Dexamethasone or Solumedrol     Comments:              PAC Discussion and Assessment    ASA Classification: 3  Case is suitable for: [facility]  Anesthetic techniques and relevant risks discussed: MAC with GA as backup                  PAC Resident/NP Anesthesia Assessment: Manasa is a 61 year old woman who is scheduled for LEFT EYE PHACOEMULSIFICATION, CATARACT, WITH INTRAOCULAR LENS IMPLANT, POSSIBLE LEFT ENDOCYCLOPHOTOCOAGULATION, POSSIBLE INSERTION of HYDRUS STENT, INJECTION INTRAVITREAL TRIESCENCE on 3/1/21 by Dr. Carrasquillo in treatment of Combined forms of age-related cataract of left eye.  PAC referral for risk assessment and optimization for anesthesia with comorbid conditions of latent TB,  HCV, HTN, right leg lymphedema, headaches, cataract and glaucoma: Pre-operative considerations: 1.  Cardiac:  Functional status- METS 4.  Denies cardiac symptoms. Low risk surgery with 0.4% (RCRI #) risk of major adverse cardiac event. EKG on 1/6/20 with sinus rhythm. No further testing for low risk procedure.   ~ HTN - not currently on medications. 130/82 today.  Recommend she continue to follow with PCP ~ Right leg lymphedema - compression stockings 2.  Pulm:  TAHMINA risk: Low (HTN, age). Nonsmoker. Denies pulmonary symptoms. 3. ENT: cataract/glaucoma - procedure as above.   4. Neuro: Headaches using Tylenol PRN. 5. GI:  Risk of PONV score = 3.  If > 2, anti-emetic intervention recommended. ~ Constipation~recent HCV diagnosis. Contact precautions as indicated6. ID: latent TB - completed treatment on 1/29/21 VTE risk: 0.5% Patient is optimized and is acceptable candidate for the proposed procedure.  No further diagnostic evaluation is needed.   **Physical exam and vital signs not completed today as this visit was scheduled as a virtual visit during Covid 19 pandemic. Physical exam should be completed the DOS in pre-op**For further details of assessment and testing please see H and P completed on same date.AD Walker PA-C

## 2021-02-25 NOTE — PATIENT INSTRUCTIONS
Preparing for Your Surgery      Name:  Manasa Rosales   MRN:  [id number]   :  1960   Today's Date:  2021         Arriving for surgery:  Surgery date:  3/1/21  Arrival time:  8AM    Restrictions due to COVID 19:  One consistent visitor is allowed per patient  No ill visitors  All visitors must wear face mask     parking is available for anyone with mobility limitations or disabilities. (Monday- Friday 7 am- 5 pm)    Please come to:    [facility]  [address] [place] [id number]    Please check in on the 5th floor at the [facility]       What can I eat or drink?    -  You may eat and drink normally until 8 hours before surgery. (Until 3/1/21, 1:30AM)  -  You may have clear liquids up to 4 hours before surgery. (Until 3/1/21, 5:30AM)  Examples of clear liquids:  Water  Clear broth  Juices (apple, white grape, white cranberry  and cider) without pulp  Noncarbonated, powder based beverages  (lemonade and Peter-Aid)  Sodas (Sprite, 7-Up, ginger ale and seltzer)  Coffee or tea (without milk or cream)  Gatorade    --No alcohol for at least 24 hours before surgery    Which medicines can I take?    Hold Aspirin for 7 days before surgery.   Hold Multivitamins for 7 days before surgery.  Hold Supplements for 7 days before surgery.  Hold Ibuprofen (Advil, Motrin) for 1 day before surgery--unless otherwise directed by surgeon.  Hold Naproxen (Aleve) for 4 days before surgery.      -  PLEASE TAKE the following medications the day of surgery     Acetaminophen(Tylenol) as needed    Cosopt eye drops    Acular eye drops    How do I prepare myself?  - Please take 2 showers before surgery using Scrubcare or Hibiclens soap.    Use this soap only from the neck to your toes.     Leave the soap on your skin for one minute--then rinse thoroughly.      You may use your own shampoo and conditioner; no other hair products.   - Please remove all jewelry and body piercings.  -  No lotions, deodorants or fragrance.  - No makeup or fingernail polish.   - Bring your ID and insurance card.        - All patients are required to have a Covid-19 test within 4 days of surgery/procedure.      -Patients will be contacted by the [facility] scheduling team within 1 week of surgery to make an appointment.      - Patients may call the Scheduling team at [phone] if they have not been scheduled within 4 days of  surgery.      ALL PATIENTS ARE REQUIRED TO HAVE A RESPONSIBLE ADULT TO DRIVE AND BE IN ATTENDANCE WITH THEM FOR 24 HOURS FOLLOWING SURGERY       Questions or Concerns:    -For questions regarding the day of surgery please contact the Ambulatory Surgery Center at [phone].    -If you have health changes between today and your surgery please contact your surgeon.     For questions after surgery please call your surgeons office.

## 2021-02-25 NOTE — H&P (VIEW-ONLY)
Pre-Operative H & P         Video-Visit Details    Type of service:  Video Visit    Patient verbally consented to video service today: YES      Video Start Time: 1344  Video End Time (time video stopped): 1404    Originating Location (pt. Location): [facility] PREOPERATIVE [facility]     Distant Location (provider location):  home    Mode of Communication:  Video Conference via [employer]        CC:  Preoperative exam to assess for increased cardiopulmonary risk while undergoing surgery and anesthesia.    Date of Encounter: 2/25/2021  Primary Care Physician:  Adarsh Colmenares  Associated diagnosis: cataract    HPI  Manasa Rosales is a 61 year old female who presents for pre-operative H & P in preparation for LEFT EYE PHACOEMULSIFICATION, CATARACT, WITH INTRAOCULAR LENS IMPLANT, POSSIBLE LEFT ENDOCYCLOPHOTOCOAGULATION, POSSIBLE INSERTION of HYDRUS STENT, INJECTION INTRAVITREAL TRIESCENCE  with Dr. Carrasquillo on 3/1/21 at [facility].     The patient is a 61 year old woman who has a past medical history significant for latent TB, HTN, right leg lymphedema, headaches, HCV, cataract and glaucoma. The patient followed up by telephone with Dr. Carrasquillo on 2/11/21 and reported worsening vision. Given her past history she has now been scheduled for the procedure as above     History is obtained from the patient and chart review.      Past Medical History  Past Medical History:   Diagnosis Date     Cataract      Essential hypertension      Glaucoma (increased eye pressure)      Lymphedema      TB lung, latent        Past Surgical History  Past Surgical History:   Procedure Laterality Date     CATARACT IOL, RT/LT Right        Hx of Blood transfusions/reactions: denies     Hx of abnormal bleeding or anti-platelet use: denies    Menstrual history: No LMP recorded. Patient is postmenopausal.    Steroid use in the last year: denies    Personal or FH with difficulty with Anesthesia:  denies    Prior to  Admission Medications  Current Outpatient Medications   Medication Sig Dispense Refill     acetaminophen (TYLENOL) 325 MG tablet Take 325-650 mg by mouth every 6 hours as needed for mild pain       dorzolamide-timolol (COSOPT) 2-0.5 % ophthalmic solution Place 1 drop Into the left eye 2 times daily 1 Bottle 11     ketorolac (ACULAR) 0.5 % ophthalmic solution Place 1 drop Into the left eye 2 times daily 5 mL 3     latanoprost (XALATAN) 0.005 % ophthalmic solution Place 1 drop into both eyes At Bedtime 2.5 mL 11     Multiple Vitamins-Iron (MULTI-VITAMIN  /IRON) TABS Take 1 tablet by mouth         Allergies  No Known Allergies    Social History  Social History     Socioeconomic History     Marital status:      Spouse name: Not on file     Number of children: Not on file     Years of education: Not on file     Highest education level: Not on file   Occupational History     Not on file   Social Needs     Financial resource strain: Not on file     Food insecurity     Worry: Not on file     Inability: Not on file     Transportation needs     Medical: Not on file     Non-medical: Not on file   Tobacco Use     Smoking status: Never Smoker     Smokeless tobacco: Never Used   Substance and Sexual Activity     Alcohol use: Never     Frequency: Never     Binge frequency: Never     Drug use: Never     Sexual activity: Not on file   Lifestyle     Physical activity     Days per week: Not on file     Minutes per session: Not on file     Stress: Not on file   Relationships     Social connections     Talks on phone: Not on file     Gets together: Not on file     Attends Cheondoism service: Not on file     Active member of club or organization: Not on file     Attends meetings of clubs or organizations: Not on file     Relationship status: Not on file     Intimate partner violence     Fear of current or ex partner: Not on file     Emotionally abused: Not on file     Physically abused: Not on file     Forced sexual activity: Not  "on file   Other Topics Concern     Not on file   Social History Narrative     Not on file       Family History  Family History   Problem Relation Age of Onset     Glaucoma Mother      Macular Degeneration No family hx of      Anesthesia Reaction No family hx of        ROS/MED HX  ENT/Pulmonary:    (-) tobacco use   Neurologic: Comment: headaches      Cardiovascular:     (+) hypertension-----Previous cardiac testing   Echo: Date: Results:    Stress Test: Date: Results:    ECG Reviewed: Date: 1/2020 Results:  NSR  Cath: Date: Results:   (-) taking anticoagulants/antiplatelets   METS/Exercise Tolerance:     Hematologic:  - neg hematologic  ROS  (-) history of blood transfusion   Musculoskeletal:  - neg musculoskeletal ROS     GI/Hepatic:  - neg GI/hepatic ROS     Renal/Genitourinary:  - neg Renal ROS     Endo:  - neg endo ROS     Psychiatric/Substance Use:  - neg psychiatric ROS     Infectious Disease: Comment: H/o latent TB s/p treatment      Malignancy:  - neg malignancy ROS     Other:            The complete review of systems is negative other than noted in the HPI or here.   Temp: 97.7  F (36.5  C) Temp src: Oral BP: 132/81 Pulse: 51   Resp: 16 SpO2: 99 %         148 lbs 0 oz  5' 1\"   Body mass index is 27.96 kg/m .       Physical Exam  Constitutional: Awake, alert, cooperative, no apparent distress, and appears stated age.  Respiratory: non labored breathing  Neuropsychiatric: Calm, cooperative. Normal affect.     Please refer to the physical examination documented by the anesthesiologist in the anesthesia record on the day of surgery    Labs: (personally reviewed)  WBC Count 3.4 - 11.7 10(3)/uL 4.0    RBC Count 3.75 - 5.13 10(6)/uL 4.22    Hemoglobin 11.9 - 14.8 g/dL 13.6    Hematocrit 34.1 - 45.3 % 40.9    MCV 80.2 - 98.8 fL 96.9    MCH 26.7 - 33.3 pg 32.2    MCHC 32.0 - 36.0 g/dL 33.3    RDW 10.0 - 16.8 % 13.7    Platelets 146 - 368 10(3)/uL 121Low     MPV 6.5 - 10.0 fL 11.9High     % Neutrophils 48.0 - 74.0 % " 39.2Low     % Lymphocytes 16.0 - 39.0 % 46.6High     % Monocytes 4.0 - 13.0 % 9.5    % Eosinophils 0.0 - 4.0 % 4.2High     % Basophils 0.0 - 2.0 % 0.5    Abs Neutrophils 1.1 - 7.7 10(3)/uL 1.6    Abs Lymphocytes 0.5 - 3.5 10(3)/uL 1.9    Abs Monocytes 0.2 - 0.9 10(3)/uL 0.4    Abs Eosinophils 0.0 - 0.4 10(3)/uL 0.2    Abs Basophils 0.0 - 0.2 10(3)/uL 0.0      Albumin 4.0 - 4.9 g/dL 3.5Low     Total Protein 6.4 - 8.3 g/dL 6.6    Globulin 2.0 - 3.5 g/dL 3.1    Albumin/Globulin Ratio 1.0 - 2.0  1.1    Aspartate Aminotransferase (AST) 0 - 32 U/L 59High     Alanine Aminotransferase (ALT) 0 - 33 U/L 34High     Alkaline Phosphatase 35 - 105 U/L 98    Bilirubin, Total 0.2 - 1.2 mg/dL 0.7    Bilirubin, Indirect 0.0 - 0.7 mg/dL 0.5    Bilirubin, Direct 0.2 - 0.3 mg/dL 0.2      Sodium 136 - 145 mmol/L 144    Potassium 3.5 - 5.1 mmol/L 3.8    Chloride 98 - 107 mmol/L 110High     CO2 22 - 29 mmol/L 27    Creatinine 0.51 - 0.95 mg/dL 0.71    Blood Urea Nitrogen 8.0 - 23.0 mg/dL 10.9    Calcium 8.6 - 10.5 mg/dL 8.8    Glucose 70 - 100 mg/dL 79    eGFR >=60 mL/min/1.73m(2) >60    eCrCl (Rx) - Adults  mL/min 85.9       EKG 1/6/20    Sinus rhythm      Outside records reviewed from: care everywhere    ASSESSMENT and PLAN  Manasa is a 61 year old woman who is scheduled for LEFT EYE PHACOEMULSIFICATION, CATARACT, WITH INTRAOCULAR LENS IMPLANT, POSSIBLE LEFT ENDOCYCLOPHOTOCOAGULATION, POSSIBLE INSERTION of HYDRUS STENT, INJECTION INTRAVITREAL TRIESCENCE on 3/1/21 by Dr. Carrasquillo in treatment of Combined forms of age-related cataract of left eye.  PAC referral for risk assessment and optimization for anesthesia with comorbid conditions of latent TB, HCV, HTN, right leg lymphedema, headaches, cataract and glaucoma:     Pre-operative considerations:     1.  Cardiac:  Functional status- METS 4.  Denies cardiac symptoms. Low risk surgery with 0.4% (RCRI #) risk of major adverse cardiac event. EKG on 1/6/20 with sinus rhythm. No further testing  for low risk procedure.     ~ HTN - not currently on medications. 130/82 today.  Recommend she continue to follow with PCP   ~ Right leg lymphedema - compression stockings     2.  Pulm:  TAHMINA risk: Low (HTN, age). Nonsmoker. Denies pulmonary symptoms.     3. ENT: cataract/glaucoma - procedure as above.       4. Neuro: Headaches using Tylenol PRN.     5. GI:  Risk of PONV score = 3.  If > 2, anti-emetic intervention recommended.   ~ Constipation  ~recent HCV diagnosis. Contact precautions as indicated    6. ID: latent TB - completed treatment on 1/29/21     VTE risk: 0.5%     Patient is optimized and is acceptable candidate for the proposed procedure.  No further diagnostic evaluation is needed.       **Physical exam and vital signs not completed today as this visit was scheduled as a virtual visit during Covid 19 pandemic. Physical exam should be completed the DOS in pre-op**    35 minutes were spent completing chart review, seeing the patient, reviewing labs and test results, discussing patient care with anesthesia and completing documentation       Tamera Roberts PA-C  Preoperative Assessment Center  [facility]  Phone: [phone]  Fax: [phone]

## 2021-02-25 NOTE — PROGRESS NOTES
Manasa is a 61 year old who is being evaluated via a billable video visit.      How would you like to obtain your AVS? Mail a copy    Will anyone else be joining your video visit? No      HPI           Review of Systems         Objective    Vitals - Patient Reported  Pain Score: Mild Pain (3)  Pain Loc: Head        Physical Exam       AFSHIN Mcmanus LPN

## 2021-02-25 NOTE — H&P
Pre-Operative H & P         Video-Visit Details    Type of service:  Video Visit    Patient verbally consented to video service today: YES      Video Start Time: 1344  Video End Time (time video stopped): 1404    Originating Location (pt. Location): [facility] PREOPERAT[facility]     Distant Location (provider location):  home    Mode of Communication:  Video Conference via [employer]        CC:  Preoperative exam to assess for increased cardiopulmonary risk while undergoing surgery and anesthesia.    Date of Encounter: 2/25/2021  Primary Care Physician:  Adarsh Colmenares  Associated diagnosis: cataract    HPI  Manasa Rosales is a 61 year old female who presents for pre-operative H & P in preparation for LEFT EYE PHACOEMULSIFICATION, CATARACT, WITH INTRAOCULAR LENS IMPLANT, POSSIBLE LEFT ENDOCYCLOPHOTOCOAGULATION, POSSIBLE INSERTION of HYDRUS STENT, INJECTION INTRAVITREAL TRIESCENCE  with Dr. Carrasquillo on 3/1/21 at [facility].     The patient is a 61 year old woman who has a past medical history significant for latent TB, HTN, right leg lymphedema, headaches, HCV, cataract and glaucoma. The patient followed up by telephone with Dr. Carrasquillo on 2/11/21 and reported worsening vision. Given her past history she has now been scheduled for the procedure as above     History is obtained from the patient and chart review.      Past Medical History  Past Medical History:   Diagnosis Date     Cataract      Essential hypertension      Glaucoma (increased eye pressure)      Lymphedema      TB lung, latent        Past Surgical History  Past Surgical History:   Procedure Laterality Date     CATARACT IOL, RT/LT Right        Hx of Blood transfusions/reactions: denies     Hx of abnormal bleeding or anti-platelet use: denies    Menstrual history: No LMP recorded. Patient is postmenopausal.    Steroid use in the last year: denies    Personal or FH with difficulty with Anesthesia:  denies    Prior to  Admission Medications  Current Outpatient Medications   Medication Sig Dispense Refill     acetaminophen (TYLENOL) 325 MG tablet Take 325-650 mg by mouth every 6 hours as needed for mild pain       dorzolamide-timolol (COSOPT) 2-0.5 % ophthalmic solution Place 1 drop Into the left eye 2 times daily 1 Bottle 11     ketorolac (ACULAR) 0.5 % ophthalmic solution Place 1 drop Into the left eye 2 times daily 5 mL 3     latanoprost (XALATAN) 0.005 % ophthalmic solution Place 1 drop into both eyes At Bedtime 2.5 mL 11     Multiple Vitamins-Iron (MULTI-VITAMIN  /IRON) TABS Take 1 tablet by mouth         Allergies  No Known Allergies    Social History  Social History     Socioeconomic History     Marital status:      Spouse name: Not on file     Number of children: Not on file     Years of education: Not on file     Highest education level: Not on file   Occupational History     Not on file   Social Needs     Financial resource strain: Not on file     Food insecurity     Worry: Not on file     Inability: Not on file     Transportation needs     Medical: Not on file     Non-medical: Not on file   Tobacco Use     Smoking status: Never Smoker     Smokeless tobacco: Never Used   Substance and Sexual Activity     Alcohol use: Never     Frequency: Never     Binge frequency: Never     Drug use: Never     Sexual activity: Not on file   Lifestyle     Physical activity     Days per week: Not on file     Minutes per session: Not on file     Stress: Not on file   Relationships     Social connections     Talks on phone: Not on file     Gets together: Not on file     Attends Zoroastrian service: Not on file     Active member of club or organization: Not on file     Attends meetings of clubs or organizations: Not on file     Relationship status: Not on file     Intimate partner violence     Fear of current or ex partner: Not on file     Emotionally abused: Not on file     Physically abused: Not on file     Forced sexual activity: Not  "on file   Other Topics Concern     Not on file   Social History Narrative     Not on file       Family History  Family History   Problem Relation Age of Onset     Glaucoma Mother      Macular Degeneration No family hx of      Anesthesia Reaction No family hx of        ROS/MED HX  ENT/Pulmonary:    (-) tobacco use   Neurologic: Comment: headaches      Cardiovascular:     (+) hypertension-----Previous cardiac testing   Echo: Date: Results:    Stress Test: Date: Results:    ECG Reviewed: Date: 1/2020 Results:  NSR  Cath: Date: Results:   (-) taking anticoagulants/antiplatelets   METS/Exercise Tolerance:     Hematologic:  - neg hematologic  ROS  (-) history of blood transfusion   Musculoskeletal:  - neg musculoskeletal ROS     GI/Hepatic:  - neg GI/hepatic ROS     Renal/Genitourinary:  - neg Renal ROS     Endo:  - neg endo ROS     Psychiatric/Substance Use:  - neg psychiatric ROS     Infectious Disease: Comment: H/o latent TB s/p treatment      Malignancy:  - neg malignancy ROS     Other:            The complete review of systems is negative other than noted in the HPI or here.   Temp: 97.7  F (36.5  C) Temp src: Oral BP: 132/81 Pulse: 51   Resp: 16 SpO2: 99 %         148 lbs 0 oz  5' 1\"   Body mass index is 27.96 kg/m .       Physical Exam  Constitutional: Awake, alert, cooperative, no apparent distress, and appears stated age.  Respiratory: non labored breathing  Neuropsychiatric: Calm, cooperative. Normal affect.     Please refer to the physical examination documented by the anesthesiologist in the anesthesia record on the day of surgery    Labs: (personally reviewed)  WBC Count 3.4 - 11.7 10(3)/uL 4.0    RBC Count 3.75 - 5.13 10(6)/uL 4.22    Hemoglobin 11.9 - 14.8 g/dL 13.6    Hematocrit 34.1 - 45.3 % 40.9    MCV 80.2 - 98.8 fL 96.9    MCH 26.7 - 33.3 pg 32.2    MCHC 32.0 - 36.0 g/dL 33.3    RDW 10.0 - 16.8 % 13.7    Platelets 146 - 368 10(3)/uL 121Low     MPV 6.5 - 10.0 fL 11.9High     % Neutrophils 48.0 - 74.0 % " 39.2Low     % Lymphocytes 16.0 - 39.0 % 46.6High     % Monocytes 4.0 - 13.0 % 9.5    % Eosinophils 0.0 - 4.0 % 4.2High     % Basophils 0.0 - 2.0 % 0.5    Abs Neutrophils 1.1 - 7.7 10(3)/uL 1.6    Abs Lymphocytes 0.5 - 3.5 10(3)/uL 1.9    Abs Monocytes 0.2 - 0.9 10(3)/uL 0.4    Abs Eosinophils 0.0 - 0.4 10(3)/uL 0.2    Abs Basophils 0.0 - 0.2 10(3)/uL 0.0      Albumin 4.0 - 4.9 g/dL 3.5Low     Total Protein 6.4 - 8.3 g/dL 6.6    Globulin 2.0 - 3.5 g/dL 3.1    Albumin/Globulin Ratio 1.0 - 2.0  1.1    Aspartate Aminotransferase (AST) 0 - 32 U/L 59High     Alanine Aminotransferase (ALT) 0 - 33 U/L 34High     Alkaline Phosphatase 35 - 105 U/L 98    Bilirubin, Total 0.2 - 1.2 mg/dL 0.7    Bilirubin, Indirect 0.0 - 0.7 mg/dL 0.5    Bilirubin, Direct 0.2 - 0.3 mg/dL 0.2      Sodium 136 - 145 mmol/L 144    Potassium 3.5 - 5.1 mmol/L 3.8    Chloride 98 - 107 mmol/L 110High     CO2 22 - 29 mmol/L 27    Creatinine 0.51 - 0.95 mg/dL 0.71    Blood Urea Nitrogen 8.0 - 23.0 mg/dL 10.9    Calcium 8.6 - 10.5 mg/dL 8.8    Glucose 70 - 100 mg/dL 79    eGFR >=60 mL/min/1.73m(2) >60    eCrCl (Rx) - Adults  mL/min 85.9       EKG 1/6/20    Sinus rhythm      Outside records reviewed from: care everywhere    ASSESSMENT and PLAN  Manasa is a 61 year old woman who is scheduled for LEFT EYE PHACOEMULSIFICATION, CATARACT, WITH INTRAOCULAR LENS IMPLANT, POSSIBLE LEFT ENDOCYCLOPHOTOCOAGULATION, POSSIBLE INSERTION of HYDRUS STENT, INJECTION INTRAVITREAL TRIESCENCE on 3/1/21 by Dr. Carrasquillo in treatment of Combined forms of age-related cataract of left eye.  PAC referral for risk assessment and optimization for anesthesia with comorbid conditions of latent TB, HCV, HTN, right leg lymphedema, headaches, cataract and glaucoma:     Pre-operative considerations:     1.  Cardiac:  Functional status- METS 4.  Denies cardiac symptoms. Low risk surgery with 0.4% (RCRI #) risk of major adverse cardiac event. EKG on 1/6/20 with sinus rhythm. No further testing  for low risk procedure.     ~ HTN - not currently on medications. 130/82 today.  Recommend she continue to follow with PCP   ~ Right leg lymphedema - compression stockings     2.  Pulm:  TAHMINA risk: Low (HTN, age). Nonsmoker. Denies pulmonary symptoms.     3. ENT: cataract/glaucoma - procedure as above.       4. Neuro: Headaches using Tylenol PRN.     5. GI:  Risk of PONV score = 3.  If > 2, anti-emetic intervention recommended.   ~ Constipation  ~recent HCV diagnosis. Contact precautions as indicated    6. ID: latent TB - completed treatment on 1/29/21     VTE risk: 0.5%     Patient is optimized and is acceptable candidate for the proposed procedure.  No further diagnostic evaluation is needed.       **Physical exam and vital signs not completed today as this visit was scheduled as a virtual visit during Covid 19 pandemic. Physical exam should be completed the DOS in pre-op**    35 minutes were spent completing chart review, seeing the patient, reviewing labs and test results, discussing patient care with anesthesia and completing documentation       Tamera Roberts PA-C  Preoperative Assessment Center  [facility]  Phone: [phone]  Fax: [phone]

## 2021-02-26 LAB
LABORATORY COMMENT REPORT: NORMAL
SARS-COV-2 RNA RESP QL NAA+PROBE: NEGATIVE
SPECIMEN SOURCE: NORMAL

## 2021-03-01 ENCOUNTER — APPOINTMENT (OUTPATIENT)
Dept: INTERPRETER SERVICES | Facility: CLINIC | Age: 61
End: 2021-03-01
Payer: COMMERCIAL

## 2021-03-01 ENCOUNTER — OFFICE VISIT (OUTPATIENT)
Dept: OPHTHALMOLOGY | Facility: CLINIC | Age: 61
End: 2021-03-01
Payer: COMMERCIAL

## 2021-03-01 ENCOUNTER — HOSPITAL ENCOUNTER (OUTPATIENT)
Facility: AMBULATORY SURGERY CENTER | Age: 61
Discharge: HOME | End: 2021-03-01
Attending: OPHTHALMOLOGY | Admitting: OPHTHALMOLOGY
Payer: COMMERCIAL

## 2021-03-01 ENCOUNTER — ANESTHESIA (OUTPATIENT)
Dept: SURGERY | Facility: AMBULATORY SURGERY CENTER | Age: 61
End: 2021-03-01

## 2021-03-01 VITALS
WEIGHT: 148 LBS | BODY MASS INDEX: 27.94 KG/M2 | TEMPERATURE: 97.3 F | RESPIRATION RATE: 16 BRPM | OXYGEN SATURATION: 99 % | SYSTOLIC BLOOD PRESSURE: 172 MMHG | HEART RATE: 56 BPM | DIASTOLIC BLOOD PRESSURE: 83 MMHG | HEIGHT: 61 IN

## 2021-03-01 DIAGNOSIS — H40.1134 PRIMARY OPEN ANGLE GLAUCOMA (POAG) OF BOTH EYES, INDETERMINATE STAGE: ICD-10-CM

## 2021-03-01 DIAGNOSIS — Z96.1 PSEUDOPHAKIA OF LEFT EYE: Primary | ICD-10-CM

## 2021-03-01 DIAGNOSIS — H25.812 COMBINED FORMS OF AGE-RELATED CATARACT OF LEFT EYE: ICD-10-CM

## 2021-03-01 DIAGNOSIS — H30.93 CHORIORETINITIS OF BOTH EYES: ICD-10-CM

## 2021-03-01 DIAGNOSIS — Z98.890 POSTOPERATIVE EYE STATE: ICD-10-CM

## 2021-03-01 PROCEDURE — 66982 XCAPSL CTRC RMVL CPLX WO ECP: CPT | Mod: LT

## 2021-03-01 PROCEDURE — 99207 PR SERVICE NOT STAFFED W/SUPERV PROV: CPT | Mod: GC | Performed by: OPHTHALMOLOGY

## 2021-03-01 PROCEDURE — 66174 TRLUML DIL AQ O/F CAN W/O ST: CPT | Mod: LT

## 2021-03-01 DEVICE — EYE IMP IOL ALCON PCL SN60WF ACRYSOF IQ 19.0: Type: IMPLANTABLE DEVICE | Site: EYE | Status: FUNCTIONAL

## 2021-03-01 RX ORDER — LIDOCAINE HYDROCHLORIDE 20 MG/ML
INJECTION, SOLUTION INFILTRATION; PERINEURAL PRN
Status: DISCONTINUED | OUTPATIENT
Start: 2021-03-01 | End: 2021-03-01

## 2021-03-01 RX ORDER — CYCLOPENTOLAT/TROPIC/PHENYLEPH 1%-1%-2.5%
1 DROPS (EA) OPHTHALMIC (EYE)
Status: COMPLETED | OUTPATIENT
Start: 2021-03-01 | End: 2021-03-01

## 2021-03-01 RX ORDER — PROPARACAINE HYDROCHLORIDE 5 MG/ML
1 SOLUTION/ DROPS OPHTHALMIC ONCE
Status: COMPLETED | OUTPATIENT
Start: 2021-03-01 | End: 2021-03-01

## 2021-03-01 RX ORDER — NALOXONE HYDROCHLORIDE 0.4 MG/ML
0.2 INJECTION, SOLUTION INTRAMUSCULAR; INTRAVENOUS; SUBCUTANEOUS
Status: DISCONTINUED | OUTPATIENT
Start: 2021-03-01 | End: 2021-03-02 | Stop reason: HOSPADM

## 2021-03-01 RX ORDER — LIDOCAINE 40 MG/G
CREAM TOPICAL
Status: DISCONTINUED | OUTPATIENT
Start: 2021-03-01 | End: 2021-03-01 | Stop reason: HOSPADM

## 2021-03-01 RX ORDER — SODIUM CHLORIDE, SODIUM LACTATE, POTASSIUM CHLORIDE, CALCIUM CHLORIDE 600; 310; 30; 20 MG/100ML; MG/100ML; MG/100ML; MG/100ML
INJECTION, SOLUTION INTRAVENOUS CONTINUOUS
Status: DISCONTINUED | OUTPATIENT
Start: 2021-03-01 | End: 2021-03-02 | Stop reason: HOSPADM

## 2021-03-01 RX ORDER — OXYCODONE HYDROCHLORIDE 5 MG/1
5 TABLET ORAL EVERY 4 HOURS PRN
Status: DISCONTINUED | OUTPATIENT
Start: 2021-03-01 | End: 2021-03-02 | Stop reason: HOSPADM

## 2021-03-01 RX ORDER — ACETAMINOPHEN 325 MG/1
975 TABLET ORAL ONCE
Status: DISCONTINUED | OUTPATIENT
Start: 2021-03-01 | End: 2021-03-01 | Stop reason: HOSPADM

## 2021-03-01 RX ORDER — GABAPENTIN 300 MG/1
300 CAPSULE ORAL ONCE
Status: DISCONTINUED | OUTPATIENT
Start: 2021-03-01 | End: 2021-03-01 | Stop reason: HOSPADM

## 2021-03-01 RX ORDER — NALOXONE HYDROCHLORIDE 0.4 MG/ML
0.4 INJECTION, SOLUTION INTRAMUSCULAR; INTRAVENOUS; SUBCUTANEOUS
Status: DISCONTINUED | OUTPATIENT
Start: 2021-03-01 | End: 2021-03-02 | Stop reason: HOSPADM

## 2021-03-01 RX ORDER — MOXIFLOXACIN IN NACL,ISO-OS/PF 0.3MG/0.3
SYRINGE (ML) INTRAOCULAR PRN
Status: DISCONTINUED | OUTPATIENT
Start: 2021-03-01 | End: 2021-03-01 | Stop reason: HOSPADM

## 2021-03-01 RX ORDER — MOXIFLOXACIN 5 MG/ML
1 SOLUTION/ DROPS OPHTHALMIC 3 TIMES DAILY
Qty: 3 ML | Refills: 1 | Status: SHIPPED | OUTPATIENT
Start: 2021-03-01 | End: 2021-03-18

## 2021-03-01 RX ORDER — MEPERIDINE HYDROCHLORIDE 25 MG/ML
12.5 INJECTION INTRAMUSCULAR; INTRAVENOUS; SUBCUTANEOUS
Status: DISCONTINUED | OUTPATIENT
Start: 2021-03-01 | End: 2021-03-02 | Stop reason: HOSPADM

## 2021-03-01 RX ORDER — HYDROMORPHONE HYDROCHLORIDE 1 MG/ML
.3-.5 INJECTION, SOLUTION INTRAMUSCULAR; INTRAVENOUS; SUBCUTANEOUS EVERY 10 MIN PRN
Status: DISCONTINUED | OUTPATIENT
Start: 2021-03-01 | End: 2021-03-02 | Stop reason: HOSPADM

## 2021-03-01 RX ORDER — PROPOFOL 10 MG/ML
INJECTION, EMULSION INTRAVENOUS PRN
Status: DISCONTINUED | OUTPATIENT
Start: 2021-03-01 | End: 2021-03-01

## 2021-03-01 RX ORDER — ONDANSETRON 4 MG/1
4 TABLET, ORALLY DISINTEGRATING ORAL EVERY 30 MIN PRN
Status: DISCONTINUED | OUTPATIENT
Start: 2021-03-01 | End: 2021-03-02 | Stop reason: HOSPADM

## 2021-03-01 RX ORDER — BALANCED SALT SOLUTION 6.4; .75; .48; .3; 3.9; 1.7 MG/ML; MG/ML; MG/ML; MG/ML; MG/ML; MG/ML
SOLUTION OPHTHALMIC PRN
Status: DISCONTINUED | OUTPATIENT
Start: 2021-03-01 | End: 2021-03-01 | Stop reason: HOSPADM

## 2021-03-01 RX ORDER — ONDANSETRON 2 MG/ML
4 INJECTION INTRAMUSCULAR; INTRAVENOUS EVERY 30 MIN PRN
Status: DISCONTINUED | OUTPATIENT
Start: 2021-03-01 | End: 2021-03-02 | Stop reason: HOSPADM

## 2021-03-01 RX ORDER — KETOROLAC TROMETHAMINE 4 MG/ML
1 SOLUTION/ DROPS OPHTHALMIC 4 TIMES DAILY
Qty: 5 ML | Refills: 1 | Status: SHIPPED | OUTPATIENT
Start: 2021-03-01 | End: 2021-03-18

## 2021-03-01 RX ORDER — TIMOLOL 5 MG/ML
SOLUTION/ DROPS OPHTHALMIC PRN
Status: DISCONTINUED | OUTPATIENT
Start: 2021-03-01 | End: 2021-03-01 | Stop reason: HOSPADM

## 2021-03-01 RX ORDER — SODIUM CHLORIDE, SODIUM LACTATE, POTASSIUM CHLORIDE, CALCIUM CHLORIDE 600; 310; 30; 20 MG/100ML; MG/100ML; MG/100ML; MG/100ML
500 INJECTION, SOLUTION INTRAVENOUS CONTINUOUS
Status: DISCONTINUED | OUTPATIENT
Start: 2021-03-01 | End: 2021-03-01 | Stop reason: HOSPADM

## 2021-03-01 RX ORDER — PREDNISOLONE ACETATE 10 MG/ML
1 SUSPENSION/ DROPS OPHTHALMIC 4 TIMES DAILY
Qty: 10 ML | Refills: 1 | Status: SHIPPED | OUTPATIENT
Start: 2021-03-01

## 2021-03-01 RX ORDER — FENTANYL CITRATE 50 UG/ML
25-50 INJECTION, SOLUTION INTRAMUSCULAR; INTRAVENOUS
Status: DISCONTINUED | OUTPATIENT
Start: 2021-03-01 | End: 2021-03-01 | Stop reason: HOSPADM

## 2021-03-01 RX ADMIN — PROPOFOL 20 MG: 10 INJECTION, EMULSION INTRAVENOUS at 09:34

## 2021-03-01 RX ADMIN — Medication 1 DROP: at 08:40

## 2021-03-01 RX ADMIN — Medication 1 DROP: at 08:32

## 2021-03-01 RX ADMIN — SODIUM CHLORIDE, SODIUM LACTATE, POTASSIUM CHLORIDE, CALCIUM CHLORIDE 500 ML: 600; 310; 30; 20 INJECTION, SOLUTION INTRAVENOUS at 08:47

## 2021-03-01 RX ADMIN — LIDOCAINE HYDROCHLORIDE 60 MG: 20 INJECTION, SOLUTION INFILTRATION; PERINEURAL at 09:31

## 2021-03-01 RX ADMIN — PROPOFOL 40 MG: 10 INJECTION, EMULSION INTRAVENOUS at 09:33

## 2021-03-01 RX ADMIN — PROPARACAINE HYDROCHLORIDE 1 DROP: 5 SOLUTION/ DROPS OPHTHALMIC at 08:31

## 2021-03-01 RX ADMIN — Medication 1 DROP: at 08:46

## 2021-03-01 ASSESSMENT — SLIT LAMP EXAM - LIDS: COMMENTS: NORMAL

## 2021-03-01 ASSESSMENT — TONOMETRY
IOP_METHOD: TONOPEN
OS_IOP_MMHG: 11

## 2021-03-01 ASSESSMENT — VISUAL ACUITY
METHOD: SNELLEN - LINEAR
OS_SC: CF@3'

## 2021-03-01 ASSESSMENT — MIFFLIN-ST. JEOR: SCORE: 1168.44

## 2021-03-01 NOTE — PROGRESS NOTES
Assessment & Plan      Manasa Rosales is a 61 year old female with the following diagnoses:   1. Pseudophakia of left eye    2. Postoperative eye state    3. Primary open angle glaucoma (POAG) of both eyes, indeterminate stage         left eye, day 0    Doing well  Keep patch in place at night for 5 days  Start post-operative drops and taper according to instructions  Post-operative do's and don'ts reviewed, questions answered    Recheck 2 weeks with refraction; sooner as needed     Nadeem Gould MD  Ophthalmology Resident, PGY-4    Not seen by staff during this visit, available should need have arisen.  Plan appropriate as above.    Colby Carrasquillo MD  , Comprehensive Ophthalmology  Department of Ophthalmology and Visual Neurosciences  [facility]

## 2021-03-01 NOTE — OP NOTE
PREOPERATIVE DIAGNOSIS:   1. Combined forms of age-related cataract of left eye    2. Chorioretinitis of both eyes    3. Primary open angle glaucoma (POAG) of both eyes, indeterminate stage                POSTOPERATIVE DIAGNOSIS: Same   PROCEDURES:   1. Complex cataract extraction with intraocular lens implant Left eye.  2. Viscodilation of Schlemm's Canal, left eye   3. Injection of intravitreal steroid, left eye   SURGEON: Colby Carrasquillo M.D.  Assistant: Eliud Willoughby MD           INDICATIONS: The patient Manasa Rosales presented to the eye clinic with decreased vision secondary to cataract in the Left eye. The risks, benefits and alternatives to cataract extraction were discussed. The patient elected to proceed. All questions were answered to the patient's satisfaction.   DESCRIPTION OF PROCEDURE:Prior to the procedure, appropriate cardiac and respiratory monitors were applied to the patient.  In the pre-operative holding area, under monitored anesthesia care, a retrobulbar injection of 2% lidocaine with hyaluronidase was given.  The patient was brought to the operating room where a surgical pause was carried out to identify with all members of the surgical team the correct surgical site.  With adequate anesthesia and akinesia, the Left eye was prepped and draped in the usual sterile fashion. A lid speculum was placed, and the operating microscope was rotated into position. A paracentesis was created.  Trypan blue stain was injected under air to stain the anterior lens capsule.  Trypan blue stain was elected due to a poor red reflex in the setting of dense cataract. Through this limbal paracentesis, the anterior chamber was inflated with dispersive viscoelastic. A temporal wound was created at the limbus using a 2.5 mm blade. A capsulorrhexis was initiated using a bent 25-gauge needle and was completed in continuous and circular fashion using the capsulorrhexis forceps. The lens nucleus was hydrodissected  using balanced salt solution.  The lens nucleus was rotated and removed using phacoemulsification in a stop and chop technique.  Residual cortical material was removed using irrigation-aspiration.  The capsular bag was reinflated to its maximal extent with cohesive viscoelastic.  A 19.0 diopter [id number] inserted into the capsular bag.  The lens power selected was reviewed using the intraocular lens power measurements that were obtained preoperatively to confirm that the correct lens was selected for the desired post-operative refractive state. The patient and the operating microscope were repositioned to allow for direct gonioscopy.  A Crystal-Calin lens was placed to visualize the nasal angle anatomy.  The trabecular meshwork was identified and the Schlemm's canal was viscodisected for 180 degrees superiorly.  Inferiorly was noted to have a stricture and unable to be cannulated.  The remaining viscoelastic was removed from the anterior chamber in its entirety, and the wounds were hydrated and found to be self-sealing.  During anterior chamber filling, the nasal conjunctiva was observed to bo well.  Intracameral moxifloxacin was administered. Calipers were used to murtaza the conjunctivae 3.5 mm posterior to the limbus inferotemporally and an extra drop of Betadine was placed on the murtaza.  A TB syringe with a 30-gauge needle was tunneled and used to inject 0.1 ml of triescence into the vitreous.  Tactile pressure was confirmed to be in a normal range.  The lid speculum was removed and a patch and shield were applied.  The patient tolerated the procedure well, and there were no complications.     PLAN: The patient will be discharged to home and will follow up tomorrow morning in the eye clinic.  EBL:  1 mL   Complications:  None  Implant Name Type Inv. Item Serial No.  Lot No. LRB No. Used Action   EYE IMP IOL ILDA PCL [id number] ACRYSOF IQ 19.0 Lens/Eye Implant EYE IMP IOL ILDA PCL [id number] ACRYSOF IQ 19.0  [id number] [employer]  Left 1 Implanted          Attending Physician Procedure Attestation: I was present for the entire procedure       Colby Carrasquillo MD  , Comprehensive Ophthalmology  Department of Ophthalmology and Visual Neurosciences  [facility]

## 2021-03-01 NOTE — ANESTHESIA POSTPROCEDURE EVALUATION
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    Anesthesia Type:  MAC    Note:  Disposition: Outpatient   Postop Pain Control: Uneventful            Sign Out: Well controlled pain   PONV: No   Neuro/Psych: Uneventful            Sign Out: Acceptable/Baseline neuro status   Airway/Respiratory: Uneventful            Sign Out: Acceptable/Baseline resp. status   CV/Hemodynamics: Uneventful            Sign Out: Acceptable CV status   Other NRE: NONE   DID A NON-ROUTINE EVENT OCCUR? No         Last vitals:  Vitals:    03/01/21 0811 03/01/21 1008   BP: (!) 195/88    Pulse: 56    Resp: 16 16   Temp: 35.8  C (96.5  F) 36.8  C (98.3  F)   SpO2: 100% 100%       Last vitals prior to Anesthesia Care Transfer:  CRNA VITALS  3/1/2021 0932 - 3/1/2021 1032      3/1/2021             Pulse:  55    Ht Rate:  55    SpO2:  100 %          Electronically Signed By: aClin Garcia MD, MD  March 1, 2021  10:34 AM

## 2021-03-01 NOTE — ANESTHESIA CARE TRANSFER NOTE
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    Anesthesia Type:   MAC     Note:    Oropharynx: spontaneously breathing  Level of Consciousness: drowsy  Oxygen Supplementation: room air    Independent Airway: airway patency satisfactory and stable  Dentition: dentition unchanged  Vital Signs Stable: post-procedure vital signs reviewed and stable  Report to RN Given: handoff report given  Patient transferred to: Phase II    Handoff Report: Identifed the Patient, Identified the Reponsible Provider, Reviewed the pertinent medical history, Discussed the surgical course, Reviewed Intra-OP anesthesia mangement and issues during anesthesia, Set expectations for post-procedure period and Allowed opportunity for questions and acknowledgement of understanding      Vitals: (Last set prior to Anesthesia Care Transfer)  CRNA VITALS  3/1/2021 0932 - 3/1/2021 1013      3/1/2021             Pulse:  55    Ht Rate:  55    SpO2:  100 %        Electronically Signed By: VALENTINA Kevin CRNA  March 1, 2021  10:13 AM

## 2021-03-18 ENCOUNTER — OFFICE VISIT (OUTPATIENT)
Dept: OPHTHALMOLOGY | Facility: CLINIC | Age: 61
End: 2021-03-18
Attending: OPHTHALMOLOGY
Payer: COMMERCIAL

## 2021-03-18 DIAGNOSIS — H40.1494 PSEUDOEXFOLIATION GLAUCOMA, INDETERMINATE STAGE: ICD-10-CM

## 2021-03-18 DIAGNOSIS — H40.1134 PRIMARY OPEN ANGLE GLAUCOMA (POAG) OF BOTH EYES, INDETERMINATE STAGE: ICD-10-CM

## 2021-03-18 DIAGNOSIS — H30.93 CHORIORETINITIS OF BOTH EYES: ICD-10-CM

## 2021-03-18 DIAGNOSIS — A18.89 EXTRAPULMONARY TUBERCULOSIS: ICD-10-CM

## 2021-03-18 DIAGNOSIS — Z96.1 PSEUDOPHAKIA OF LEFT EYE: Primary | ICD-10-CM

## 2021-03-18 PROCEDURE — 99024 POSTOP FOLLOW-UP VISIT: CPT | Performed by: OPHTHALMOLOGY

## 2021-03-18 PROCEDURE — 92015 DETERMINE REFRACTIVE STATE: CPT

## 2021-03-18 PROCEDURE — G0463 HOSPITAL OUTPT CLINIC VISIT: HCPCS

## 2021-03-18 ASSESSMENT — REFRACTION_MANIFEST
OS_ADD: +2.75
OS_CYLINDER: SPHERE
OD_AXIS: 007
OD_CYLINDER: +1.00
OD_ADD: +2.75
OD_SPHERE: -1.50
OS_SPHERE: -1.00

## 2021-03-18 ASSESSMENT — VISUAL ACUITY
OS_SC+: -1
OD_SC+: -1
METHOD: SNELLEN - LINEAR
OS_SC: 20/70
OD_SC: 20/70

## 2021-03-18 ASSESSMENT — EXTERNAL EXAM - LEFT EYE: OS_EXAM: NORMAL

## 2021-03-18 ASSESSMENT — SLIT LAMP EXAM - LIDS
COMMENTS: NORMAL
COMMENTS: NORMAL

## 2021-03-18 ASSESSMENT — TONOMETRY
OS_IOP_MMHG: 11
OD_IOP_MMHG: 17
IOP_METHOD: TONOPEN

## 2021-03-18 ASSESSMENT — CUP TO DISC RATIO
OS_RATIO: 0.75
OD_RATIO: 0.7

## 2021-03-18 ASSESSMENT — EXTERNAL EXAM - RIGHT EYE: OD_EXAM: NORMAL

## 2021-03-18 NOTE — LETTER
"3/18/2021       RE: Manasa Rosales  [address] [place] [address] [address]  [address]  [place] [address]     Dear Dr. Agarwal,    Thank you for referring your patient, Manasa Rosales, to the [facility] at [facility]. Please see a copy of my visit note below.    Chief Complaint(s) and History of Present Illness(es)     Post Op (Ophthalmology) Left Eye     Laterality: left eye    Associated symptoms: Negative for eye pain, redness, dryness and tearing              Comments     2.5 week post op LE CE/IOL (03/01/21). Pt notes vision is good in the LE.   Pt c/o a \"foreign material\" in the eye, but denies any eye pain. Pt denies   any other changes or concerns.     Ocular meds:  Pred TID LE  Ketorolac QID LE  Latanoprost at bedtime BE (ran out of)  Cosopt BID LE    Stephanie Doyle, COMT 3:35 PM March 18, 2021               Review of systems for the eyes was negative other than the pertinent positives/negatives listed in the HPI.      Assessment & Plan      Manasa Rosales is a 61 year old female with the following diagnoses:   1. Pseudophakia of left eye    2. Primary open angle glaucoma (POAG) of both eyes, indeterminate stage    3. Pseudoexfoliation glaucoma, indeterminate stage    4. Chorioretinitis of both eyes    5. Extrapulmonary tuberculosis       S/P cataract extraction c [facility]NI viscodilation and IVTA left eye 3/1/21    Doing well, greatly improved visual acuity  Limited due to macular atrophy  Intraocular pressure excellent  Inflammation quiescent  To see Dr. Agarwal locally in 9-10 months, sooner as needed     Continue latanoprost at bedtime both eyes for now  Continue Cosopt twice a day both eyes   Taper Predforte as directed  Glasses prescription updated  Ok to resume normal activities  Artificial tears as needed      Patient disposition:   Return if symptoms worsen or fail to improve.    Attending Physician Attestation:  Complete documentation of historical and exam " elements from today's encounter can be found in the full encounter summary report (not reduplicated in this progress note).  I personally obtained the chief complaint(s) and history of present illness.  I confirmed and edited as necessary the review of systems, past medical/surgical history, family history, social history, and examination findings as documented by others; and I examined the patient myself.  I personally reviewed the relevant tests, images, and reports as documented above.  I formulated and edited as necessary the assessment and plan and discussed the findings and management plan with the patient and family. . - Colby Carrasquillo MD

## 2021-03-18 NOTE — NURSING NOTE
"Chief Complaint(s) and History of Present Illness(es)     Post Op (Ophthalmology) Left Eye     In left eye.  Associated symptoms include Negative for eye pain, redness, dryness and tearing.              Comments     2.5 week post op LE CE/IOL (03/01/21). Pt notes vision is good in the LE. Pt c/o a \"foreign material\" in the eye, but denies any eye pain. Pt denies any other changes or concerns.     Ocular meds:  Pred TID LE  Ketorolac QID LE  Latanoprost at bedtime BE (ran out of)  Cosopt BID LE    Stephanie Kwon, COMT 3:35 PM March 18, 2021                   "

## 2021-03-18 NOTE — PATIENT INSTRUCTIONS
Continue Prednisolone (white top) twice a day for 1 week, then once a day for 1 week, then stop     Continue Cosopt (blue top) twice a day     Continue Latanoprost (teal top) at bedtime in both eyes    Artificial tears four times a day and as needed      Stop Ketorolac   Stop Moxifloxacin

## 2021-03-19 NOTE — PROGRESS NOTES
"Chief Complaint(s) and History of Present Illness(es)     Post Op (Ophthalmology) Left Eye     Laterality: left eye    Associated symptoms: Negative for eye pain, redness, dryness and tearing              Comments     2.5 week post op LE CE/IOL (03/01/21). Pt notes vision is good in the LE.   Pt c/o a \"foreign material\" in the eye, but denies any eye pain. Pt denies   any other changes or concerns.     Ocular meds:  Pred TID LE  Ketorolac QID LE  Latanoprost at bedtime BE (ran out of)  Cosopt BID LE    Stephanie Kwon, COMT 3:35 PM March 18, 2021               Review of systems for the eyes was negative other than the pertinent positives/negatives listed in the HPI.      Assessment & Plan      Manasa Rosales is a 61 year old female with the following diagnoses:   1. Pseudophakia of left eye    2. Primary open angle glaucoma (POAG) of both eyes, indeterminate stage    3. Pseudoexfoliation glaucoma, indeterminate stage    4. Chorioretinitis of both eyes    5. Extrapulmonary tuberculosis       S/P cataract extraction c OMNI viscodilation and IVTA left eye 3/1/21    Doing well, greatly improved visual acuity  Limited due to macular atrophy  Intraocular pressure excellent  Inflammation quiescent  To see Dr. Agarwal locally in 9-10 months, sooner as needed     Continue latanoprost at bedtime both eyes for now  Continue Cosopt twice a day both eyes   Taper Predforte as directed  Glasses prescription updated  Ok to resume normal activities  Artificial tears as needed      Patient disposition:   Return if symptoms worsen or fail to improve.           Attending Physician Attestation:  Complete documentation of historical and exam elements from today's encounter can be found in the full encounter summary report (not reduplicated in this progress note).  I personally obtained the chief complaint(s) and history of present illness.  I confirmed and edited as necessary the review of systems, past medical/surgical history, family " history, social history, and examination findings as documented by others; and I examined the patient myself.  I personally reviewed the relevant tests, images, and reports as documented above.  I formulated and edited as necessary the assessment and plan and discussed the findings and management plan with the patient and family. . - Colby Carrasquillo MD

## 2022-04-28 ENCOUNTER — TELEPHONE (OUTPATIENT)
Dept: OPHTHALMOLOGY | Facility: CLINIC | Age: 62
End: 2022-04-28
Payer: COMMERCIAL

## 2022-04-28 NOTE — TELEPHONE ENCOUNTER
[facility] Call Center    Phone Message    May a detailed message be left on voicemail: yes     Reason for Call: Other: Pts son called in wanting pts Eye glass RX sent to [facility]. Fax number : [phone] . Thanks      Action Taken: Message routed to:  [facility] eye    Travel Screening: Not Applicable

## 2022-04-28 NOTE — TELEPHONE ENCOUNTER
Per pts request      Pts son called in wanting pts Eye glass RX sent to [facility]. Fax number : [phone] . Thanks

## 2022-05-31 ENCOUNTER — TELEPHONE (OUTPATIENT)
Dept: OPHTHALMOLOGY | Facility: CLINIC | Age: 62
End: 2022-05-31
Payer: COMMERCIAL

## 2022-05-31 NOTE — TELEPHONE ENCOUNTER
[facility] Call Center    Phone Message    May a detailed message be left on voicemail: yes     Reason for Call: Pt's glass rx  on 22. Son would like to know if it's possible for provider to extend rx for them. Please follow-up.     Action Taken: Message routed to:  [facility] EYE    Travel Screening: Not Applicable

## (undated) DEVICE — EYE TIP IRRIGATION & ASPIRATION POLYMER CVD 0.3MM 8065751512

## (undated) DEVICE — PACK CATARACT CUSTOM ASC SEY15CPUMC

## (undated) DEVICE — GLOVE PROTEXIS MICRO 7.5  2D73PM75

## (undated) DEVICE — Device

## (undated) DEVICE — LINEN TOWEL PACK X5 5464

## (undated) DEVICE — SOL WATER IRRIG 500ML BOTTLE 2F7113

## (undated) DEVICE — EYE PACK CUSTOM ANTERIOR 30DEG TIP CENTURION PPK6682-04

## (undated) DEVICE — EYE CANN IRR 25GA CYSTOTOME 581610

## (undated) DEVICE — EYE SHIELD PLASTIC

## (undated) DEVICE — EYE CANN IRR 27GA ANTERIOR CHAMBER 581280

## (undated) DEVICE — EYE KNIFE SLIT XSTAR VISITEC 2.6MM 45DEG 373726

## (undated) DEVICE — EYE KNIFE STILETTO VISITEC 1.1MM ANG 45DEG SIDEPORT 376620

## (undated) RX ORDER — LIDOCAINE HYDROCHLORIDE 20 MG/ML
INJECTION, SOLUTION EPIDURAL; INFILTRATION; INTRACAUDAL; PERINEURAL
Status: DISPENSED
Start: 2021-03-01

## (undated) RX ORDER — ACETAMINOPHEN 325 MG/1
TABLET ORAL
Status: DISPENSED
Start: 2021-03-01

## (undated) RX ORDER — PROPOFOL 10 MG/ML
INJECTION, EMULSION INTRAVENOUS
Status: DISPENSED
Start: 2021-03-01